# Patient Record
Sex: MALE | Race: WHITE | Employment: OTHER | URBAN - METROPOLITAN AREA
[De-identification: names, ages, dates, MRNs, and addresses within clinical notes are randomized per-mention and may not be internally consistent; named-entity substitution may affect disease eponyms.]

---

## 2017-01-17 ENCOUNTER — HOSPITAL ENCOUNTER (OUTPATIENT)
Dept: SURGERY | Age: 77
Discharge: HOME | End: 2017-01-17
Payer: MEDICARE

## 2017-01-17 VITALS
RESPIRATION RATE: 20 BRPM | BODY MASS INDEX: 30.35 KG/M2 | HEIGHT: 70 IN | OXYGEN SATURATION: 96 % | WEIGHT: 212 LBS | SYSTOLIC BLOOD PRESSURE: 139 MMHG | HEART RATE: 81 BPM | TEMPERATURE: 97.8 F | DIASTOLIC BLOOD PRESSURE: 75 MMHG

## 2017-01-17 LAB
ANION GAP BLD CALC-SCNC: 9 MMOL/L (ref 7–16)
BUN SERPL-MCNC: 21 MG/DL (ref 8–23)
CALCIUM SERPL-MCNC: 8.6 MG/DL (ref 8.3–10.4)
CHLORIDE SERPL-SCNC: 111 MMOL/L (ref 98–107)
CO2 SERPL-SCNC: 26 MMOL/L (ref 21–32)
CREAT SERPL-MCNC: 1.07 MG/DL (ref 0.8–1.5)
ERYTHROCYTE [DISTWIDTH] IN BLOOD BY AUTOMATED COUNT: 13.7 % (ref 11.9–14.6)
GLUCOSE SERPL-MCNC: 168 MG/DL (ref 65–100)
HCT VFR BLD AUTO: 45 % (ref 41.1–50.3)
HGB BLD-MCNC: 15.2 G/DL (ref 13.6–17.2)
MCH RBC QN AUTO: 30.4 PG (ref 26.1–32.9)
MCHC RBC AUTO-ENTMCNC: 33.8 G/DL (ref 31.4–35)
MCV RBC AUTO: 90 FL (ref 79.6–97.8)
PLATELET # BLD AUTO: 114 K/UL (ref 150–450)
PMV BLD AUTO: 10.9 FL (ref 10.8–14.1)
POTASSIUM SERPL-SCNC: 3.7 MMOL/L (ref 3.5–5.1)
RBC # BLD AUTO: 5 M/UL (ref 4.23–5.67)
SODIUM SERPL-SCNC: 146 MMOL/L (ref 136–145)
WBC # BLD AUTO: 14.8 K/UL (ref 4.3–11.1)

## 2017-01-17 PROCEDURE — 80048 BASIC METABOLIC PNL TOTAL CA: CPT | Performed by: UROLOGY

## 2017-01-17 PROCEDURE — 85027 COMPLETE CBC AUTOMATED: CPT | Performed by: UROLOGY

## 2017-01-17 NOTE — PERIOP NOTES
Patient verified name, , and surgery as listed in [facility]. TYPE  CASE:2  Orders per surgeon:  Received  Labs per surgeon:cbc, bmp  Labs per anesthesia protocol: none   EKG  :  16 EKG follwoed by16 ECHO-WNL placed on chart      Patient provided with handouts including guide to surgery , transfusions, pain management and hand hygiene for the family and community. Pt verbalizes understanding of all pre-op instructions . Instructed that family must be present in building at all times. Hibiclens and instructions given per hospital policy. Instructed patient to continue  previous medications as prescribed prior to surgery and  to take the following medications the day of surgery according to anesthesia guidelines : lipitor, coreg, paxil if needed           Continue all previous medications unless otherwise directed. Instructed patient to hold  the following medications prior to surgery: aspirin 81 mg with permission from Dr. Reynoso Has on chart      Patient verbalized understanding of all instructions and provided all medical/health information to the best of their ability.

## 2017-01-19 ENCOUNTER — ANESTHESIA (OUTPATIENT)
Dept: SURGERY | Age: 77
End: 2017-01-19
Payer: MEDICARE

## 2017-01-19 ENCOUNTER — ANESTHESIA EVENT (OUTPATIENT)
Dept: SURGERY | Age: 77
End: 2017-01-19
Payer: MEDICARE

## 2017-01-19 ENCOUNTER — HOSPITAL ENCOUNTER (OUTPATIENT)
Age: 77
Setting detail: OBSERVATION
Discharge: HOME | End: 2017-01-21
Attending: UROLOGY | Admitting: UROLOGY
Payer: MEDICARE

## 2017-01-19 LAB
ANION GAP BLD CALC-SCNC: 9 MMOL/L (ref 7–16)
BUN SERPL-MCNC: 22 MG/DL (ref 8–23)
CALCIUM SERPL-MCNC: 8 MG/DL (ref 8.3–10.4)
CHLORIDE SERPL-SCNC: 105 MMOL/L (ref 98–107)
CO2 SERPL-SCNC: 28 MMOL/L (ref 21–32)
CREAT SERPL-MCNC: 1.11 MG/DL (ref 0.8–1.5)
GLUCOSE SERPL-MCNC: 99 MG/DL (ref 65–100)
HCT VFR BLD AUTO: 40.4 % (ref 41.1–50.3)
HGB BLD-MCNC: 13.5 G/DL (ref 13.6–17.2)
POTASSIUM SERPL-SCNC: 3.7 MMOL/L (ref 3.5–5.1)
SODIUM SERPL-SCNC: 142 MMOL/L (ref 136–145)

## 2017-01-19 PROCEDURE — 74011250636 HC RX REV CODE- 250/636

## 2017-01-19 PROCEDURE — 36415 COLL VENOUS BLD VENIPUNCTURE: CPT | Performed by: UROLOGY

## 2017-01-19 PROCEDURE — 77030020782 HC GWN BAIR PAWS FLX 3M -B: Performed by: NURSE ANESTHETIST, CERTIFIED REGISTERED

## 2017-01-19 PROCEDURE — 77030033648: Performed by: UROLOGY

## 2017-01-19 PROCEDURE — 76210000004 HC OR PH I REC 4.5 TO 5 HR: Performed by: UROLOGY

## 2017-01-19 PROCEDURE — 77030018836 HC SOL IRR NACL ICUM -A

## 2017-01-19 PROCEDURE — 82355 CALCULUS ANALYSIS QUAL: CPT | Performed by: UROLOGY

## 2017-01-19 PROCEDURE — 77030005546 HC CATH URETH FOL 3W BARD -A: Performed by: UROLOGY

## 2017-01-19 PROCEDURE — 74011000258 HC RX REV CODE- 258: Performed by: UROLOGY

## 2017-01-19 PROCEDURE — 99218 HC RM OBSERVATION: CPT

## 2017-01-19 PROCEDURE — 80048 BASIC METABOLIC PNL TOTAL CA: CPT | Performed by: UROLOGY

## 2017-01-19 PROCEDURE — 77030011640 HC PAD GRND REM COVD -A: Performed by: UROLOGY

## 2017-01-19 PROCEDURE — 76060000035 HC ANESTHESIA 2 TO 2.5 HR: Performed by: UROLOGY

## 2017-01-19 PROCEDURE — 74011250636 HC RX REV CODE- 250/636: Performed by: UROLOGY

## 2017-01-19 PROCEDURE — 74011250636 HC RX REV CODE- 250/636: Performed by: ANESTHESIOLOGY

## 2017-01-19 PROCEDURE — 77030010545: Performed by: UROLOGY

## 2017-01-19 PROCEDURE — 85018 HEMOGLOBIN: CPT | Performed by: UROLOGY

## 2017-01-19 PROCEDURE — 74011000250 HC RX REV CODE- 250: Performed by: ANESTHESIOLOGY

## 2017-01-19 PROCEDURE — 77030019927 HC TBNG IRR CYSTO BAXT -A: Performed by: UROLOGY

## 2017-01-19 PROCEDURE — 88305 TISSUE EXAM BY PATHOLOGIST: CPT | Performed by: UROLOGY

## 2017-01-19 PROCEDURE — 76010000162 HC OR TIME 1.5 TO 2 HR INTENSV-TIER 1: Performed by: UROLOGY

## 2017-01-19 PROCEDURE — 77030018830 HC SOL IRR GLYC ICUM-A: Performed by: UROLOGY

## 2017-01-19 PROCEDURE — 77030020143 HC AIRWY LARYN INTUB CGAS -A: Performed by: NURSE ANESTHETIST, CERTIFIED REGISTERED

## 2017-01-19 PROCEDURE — 74011000250 HC RX REV CODE- 250

## 2017-01-19 PROCEDURE — 77030018832 HC SOL IRR H20 ICUM -A: Performed by: UROLOGY

## 2017-01-19 PROCEDURE — 77030018846 HC SOL IRR STRL H20 ICUM -A: Performed by: UROLOGY

## 2017-01-19 PROCEDURE — 77030032490 HC SLV COMPR SCD KNE COVD -B: Performed by: UROLOGY

## 2017-01-19 RX ORDER — CEFAZOLIN SODIUM IN 0.9 % NACL 2 G/50 ML
2 INTRAVENOUS SOLUTION, PIGGYBACK (ML) INTRAVENOUS ONCE
Status: COMPLETED | OUTPATIENT
Start: 2017-01-19 | End: 2017-01-19

## 2017-01-19 RX ORDER — ACETAMINOPHEN 325 MG/1
650 TABLET ORAL
Status: DISCONTINUED | OUTPATIENT
Start: 2017-01-19 | End: 2017-01-21 | Stop reason: HOSPADM

## 2017-01-19 RX ORDER — DEXTROSE MONOHYDRATE AND SODIUM CHLORIDE 5; .45 G/100ML; G/100ML
75 INJECTION, SOLUTION INTRAVENOUS CONTINUOUS
Status: DISCONTINUED | OUTPATIENT
Start: 2017-01-19 | End: 2017-01-20

## 2017-01-19 RX ORDER — ONDANSETRON 2 MG/ML
4 INJECTION INTRAMUSCULAR; INTRAVENOUS
Status: DISCONTINUED | OUTPATIENT
Start: 2017-01-19 | End: 2017-01-21 | Stop reason: HOSPADM

## 2017-01-19 RX ORDER — HYDROCODONE BITARTRATE AND ACETAMINOPHEN 5; 325 MG/1; MG/1
1 TABLET ORAL
Status: DISCONTINUED | OUTPATIENT
Start: 2017-01-19 | End: 2017-01-21 | Stop reason: HOSPADM

## 2017-01-19 RX ORDER — LIDOCAINE HYDROCHLORIDE 10 MG/ML
0.1 INJECTION INFILTRATION; PERINEURAL AS NEEDED
Status: DISCONTINUED | OUTPATIENT
Start: 2017-01-19 | End: 2017-01-19 | Stop reason: HOSPADM

## 2017-01-19 RX ORDER — LABETALOL HYDROCHLORIDE 5 MG/ML
10 INJECTION, SOLUTION INTRAVENOUS ONCE
Status: COMPLETED | OUTPATIENT
Start: 2017-01-19 | End: 2017-01-19

## 2017-01-19 RX ORDER — HYDROMORPHONE HYDROCHLORIDE 2 MG/ML
0.5 INJECTION, SOLUTION INTRAMUSCULAR; INTRAVENOUS; SUBCUTANEOUS
Status: DISCONTINUED | OUTPATIENT
Start: 2017-01-19 | End: 2017-01-19 | Stop reason: HOSPADM

## 2017-01-19 RX ORDER — SODIUM CHLORIDE 0.9 % (FLUSH) 0.9 %
5-10 SYRINGE (ML) INJECTION EVERY 8 HOURS
Status: CANCELLED | OUTPATIENT
Start: 2017-01-19

## 2017-01-19 RX ORDER — FENTANYL CITRATE 50 UG/ML
INJECTION, SOLUTION INTRAMUSCULAR; INTRAVENOUS AS NEEDED
Status: DISCONTINUED | OUTPATIENT
Start: 2017-01-19 | End: 2017-01-19 | Stop reason: HOSPADM

## 2017-01-19 RX ORDER — SODIUM CHLORIDE 0.9 % (FLUSH) 0.9 %
5-10 SYRINGE (ML) INJECTION AS NEEDED
Status: DISCONTINUED | OUTPATIENT
Start: 2017-01-19 | End: 2017-01-19 | Stop reason: HOSPADM

## 2017-01-19 RX ORDER — SODIUM CHLORIDE, SODIUM LACTATE, POTASSIUM CHLORIDE, CALCIUM CHLORIDE 600; 310; 30; 20 MG/100ML; MG/100ML; MG/100ML; MG/100ML
125 INJECTION, SOLUTION INTRAVENOUS CONTINUOUS
Status: DISCONTINUED | OUTPATIENT
Start: 2017-01-19 | End: 2017-01-19 | Stop reason: HOSPADM

## 2017-01-19 RX ORDER — CARVEDILOL 25 MG/1
25 TABLET ORAL 2 TIMES DAILY WITH MEALS
Status: DISCONTINUED | OUTPATIENT
Start: 2017-01-20 | End: 2017-01-21 | Stop reason: HOSPADM

## 2017-01-19 RX ORDER — LABETALOL HYDROCHLORIDE 5 MG/ML
INJECTION, SOLUTION INTRAVENOUS
Status: ACTIVE
Start: 2017-01-19 | End: 2017-01-20

## 2017-01-19 RX ORDER — RAMIPRIL 5 MG/1
5 CAPSULE ORAL
Status: DISCONTINUED | OUTPATIENT
Start: 2017-01-20 | End: 2017-01-21 | Stop reason: HOSPADM

## 2017-01-19 RX ORDER — LABETALOL HYDROCHLORIDE 5 MG/ML
INJECTION, SOLUTION INTRAVENOUS AS NEEDED
Status: DISCONTINUED | OUTPATIENT
Start: 2017-01-19 | End: 2017-01-19 | Stop reason: HOSPADM

## 2017-01-19 RX ORDER — ONDANSETRON 2 MG/ML
INJECTION INTRAMUSCULAR; INTRAVENOUS AS NEEDED
Status: DISCONTINUED | OUTPATIENT
Start: 2017-01-19 | End: 2017-01-19 | Stop reason: HOSPADM

## 2017-01-19 RX ORDER — PROPOFOL 10 MG/ML
INJECTION, EMULSION INTRAVENOUS AS NEEDED
Status: DISCONTINUED | OUTPATIENT
Start: 2017-01-19 | End: 2017-01-19 | Stop reason: HOSPADM

## 2017-01-19 RX ORDER — SODIUM CHLORIDE 0.9 % (FLUSH) 0.9 %
5-10 SYRINGE (ML) INJECTION AS NEEDED
Status: DISCONTINUED | OUTPATIENT
Start: 2017-01-19 | End: 2017-01-21 | Stop reason: HOSPADM

## 2017-01-19 RX ORDER — OXYCODONE HYDROCHLORIDE 5 MG/1
10 TABLET ORAL
Status: DISCONTINUED | OUTPATIENT
Start: 2017-01-19 | End: 2017-01-19 | Stop reason: HOSPADM

## 2017-01-19 RX ORDER — HYDROMORPHONE HYDROCHLORIDE 1 MG/ML
.5-2 INJECTION, SOLUTION INTRAMUSCULAR; INTRAVENOUS; SUBCUTANEOUS
Status: DISCONTINUED | OUTPATIENT
Start: 2017-01-19 | End: 2017-01-21 | Stop reason: HOSPADM

## 2017-01-19 RX ORDER — OXYBUTYNIN CHLORIDE 5 MG/1
5 TABLET ORAL
Status: DISCONTINUED | OUTPATIENT
Start: 2017-01-19 | End: 2017-01-21 | Stop reason: HOSPADM

## 2017-01-19 RX ORDER — PAROXETINE 10 MG/1
5 TABLET, FILM COATED ORAL DAILY
Status: DISCONTINUED | OUTPATIENT
Start: 2017-01-20 | End: 2017-01-21 | Stop reason: HOSPADM

## 2017-01-19 RX ORDER — NALOXONE HYDROCHLORIDE 0.4 MG/ML
0.1 INJECTION, SOLUTION INTRAMUSCULAR; INTRAVENOUS; SUBCUTANEOUS AS NEEDED
Status: DISCONTINUED | OUTPATIENT
Start: 2017-01-19 | End: 2017-01-19 | Stop reason: HOSPADM

## 2017-01-19 RX ORDER — NALOXONE HYDROCHLORIDE 0.4 MG/ML
0.4 INJECTION, SOLUTION INTRAMUSCULAR; INTRAVENOUS; SUBCUTANEOUS AS NEEDED
Status: DISCONTINUED | OUTPATIENT
Start: 2017-01-19 | End: 2017-01-21 | Stop reason: HOSPADM

## 2017-01-19 RX ORDER — SODIUM CHLORIDE 0.9 % (FLUSH) 0.9 %
5-10 SYRINGE (ML) INJECTION EVERY 8 HOURS
Status: DISCONTINUED | OUTPATIENT
Start: 2017-01-19 | End: 2017-01-21 | Stop reason: HOSPADM

## 2017-01-19 RX ORDER — LIDOCAINE HYDROCHLORIDE 20 MG/ML
INJECTION, SOLUTION EPIDURAL; INFILTRATION; INTRACAUDAL; PERINEURAL AS NEEDED
Status: DISCONTINUED | OUTPATIENT
Start: 2017-01-19 | End: 2017-01-19 | Stop reason: HOSPADM

## 2017-01-19 RX ADMIN — FENTANYL CITRATE 25 MCG: 50 INJECTION, SOLUTION INTRAMUSCULAR; INTRAVENOUS at 12:58

## 2017-01-19 RX ADMIN — FENTANYL CITRATE 50 MCG: 50 INJECTION, SOLUTION INTRAMUSCULAR; INTRAVENOUS at 12:11

## 2017-01-19 RX ADMIN — FENTANYL CITRATE 50 MCG: 50 INJECTION, SOLUTION INTRAMUSCULAR; INTRAVENOUS at 13:24

## 2017-01-19 RX ADMIN — FENTANYL CITRATE 25 MCG: 50 INJECTION, SOLUTION INTRAMUSCULAR; INTRAVENOUS at 13:01

## 2017-01-19 RX ADMIN — Medication 10 ML: at 22:41

## 2017-01-19 RX ADMIN — LABETALOL HYDROCHLORIDE 10 MG: 5 INJECTION, SOLUTION INTRAVENOUS at 14:38

## 2017-01-19 RX ADMIN — LABETALOL HYDROCHLORIDE 10 MG: 5 INJECTION, SOLUTION INTRAVENOUS at 14:04

## 2017-01-19 RX ADMIN — FENTANYL CITRATE 25 MCG: 50 INJECTION, SOLUTION INTRAMUSCULAR; INTRAVENOUS at 12:36

## 2017-01-19 RX ADMIN — FENTANYL CITRATE 25 MCG: 50 INJECTION, SOLUTION INTRAMUSCULAR; INTRAVENOUS at 12:35

## 2017-01-19 RX ADMIN — HYDROMORPHONE HYDROCHLORIDE 0.5 MG: 2 INJECTION, SOLUTION INTRAMUSCULAR; INTRAVENOUS; SUBCUTANEOUS at 14:18

## 2017-01-19 RX ADMIN — PROPOFOL 200 MG: 10 INJECTION, EMULSION INTRAVENOUS at 12:11

## 2017-01-19 RX ADMIN — ONDANSETRON 4 MG: 2 INJECTION INTRAMUSCULAR; INTRAVENOUS at 13:47

## 2017-01-19 RX ADMIN — CEFAZOLIN SODIUM 1 G: 1 INJECTION, POWDER, FOR SOLUTION INTRAMUSCULAR; INTRAVENOUS at 22:31

## 2017-01-19 RX ADMIN — LIDOCAINE HYDROCHLORIDE 100 MG: 20 INJECTION, SOLUTION EPIDURAL; INFILTRATION; INTRACAUDAL; PERINEURAL at 12:11

## 2017-01-19 RX ADMIN — HYDROMORPHONE HYDROCHLORIDE 0.5 MG: 2 INJECTION, SOLUTION INTRAMUSCULAR; INTRAVENOUS; SUBCUTANEOUS at 15:40

## 2017-01-19 RX ADMIN — SODIUM CHLORIDE, SODIUM LACTATE, POTASSIUM CHLORIDE, AND CALCIUM CHLORIDE 125 ML/HR: 600; 310; 30; 20 INJECTION, SOLUTION INTRAVENOUS at 11:13

## 2017-01-19 RX ADMIN — HYDROMORPHONE HYDROCHLORIDE 1 MG: 1 INJECTION, SOLUTION INTRAMUSCULAR; INTRAVENOUS; SUBCUTANEOUS at 21:29

## 2017-01-19 RX ADMIN — DEXTROSE MONOHYDRATE AND SODIUM CHLORIDE 75 ML/HR: 5; .45 INJECTION, SOLUTION INTRAVENOUS at 22:27

## 2017-01-19 RX ADMIN — CEFAZOLIN 2 G: 1 INJECTION, POWDER, FOR SOLUTION INTRAMUSCULAR; INTRAVENOUS; PARENTERAL at 12:20

## 2017-01-19 NOTE — IP AVS SNAPSHOT
Current Discharge Medication List  
  
Take these medications at their scheduled times Dose & Instructions Dispensing Information Comments Morning Noon Evening Bedtime  
 atorvastatin 20 mg tablet Commonly known as:  LIPITOR Your next dose is: Today, Tomorrow Other:  ____________ Dose:  20 mg Take 20 mg by mouth every morning. Refills:  0 COREG 25 mg tablet Generic drug:  carvedilol Your next dose is: Today, Tomorrow Other:  ____________ Dose:  25 mg Take 25 mg by mouth two (2) times daily (with meals). Refills:  0  
     
   
   
   
  
 nitrofurantoin 100 mg capsule Commonly known as:  MACRODANTIN Your next dose is: Today, Tomorrow Other:  ____________ Dose:  100 mg Take 1 Cap by mouth two (2) times a day. Quantity:  6 Cap Refills:  0  
     
   
   
   
  
 ramipril 5 mg capsule Commonly known as:  ALTACE Your next dose is: Today, Tomorrow Other:  ____________ Dose:  5 mg Take 5 mg by mouth every morning. Refills:  0 Take these medications as needed Dose & Instructions Dispensing Information Comments Morning Noon Evening Bedtime PAXIL 10 mg tablet Generic drug:  PARoxetine Your next dose is: Today, Tomorrow Other:  ____________ Dose:  5 mg Take 5 mg by mouth as needed. Refills:  0 phenazopyridine 200 mg tablet Commonly known as:  PYRIDIUM Your next dose is: Today, Tomorrow Other:  ____________ Dose:  200 mg Take 1 Tab by mouth three (3) times daily as needed for Pain for up to 3 days. Indications: DYSURIA Quantity:  9 Tab Refills:  0 Where to Get Your Medications These medications were sent to [address] [place] [address] [address]  [address] [address], [address] Phone:  [phone]  
  nitrofurantoin 100 mg capsule  
 phenazopyridine 200 mg tablet

## 2017-01-19 NOTE — PERIOP NOTES
Pt c/o back pain, cont to wait for bed assignment on the 6th floor. Turned and positioned on left side. Gave pt prn Dilaudid for pain. Gu with irrigant draining pink urine.

## 2017-01-19 NOTE — PROGRESS NOTES
TRANSFER - IN REPORT:    Verbal report received from Jamee Ramos RN(name) on Marco Antonio Andujar  being received from pacu(unit) for routine post - op      Report consisted of patients Situation, Background, Assessment and   Recommendations(SBAR). Information from the following report(s) SBAR was reviewed with the receiving nurse. Opportunity for questions and clarification was provided. Assessment completed upon patients arrival to unit and care assumed.

## 2017-01-19 NOTE — ANESTHESIA POSTPROCEDURE EVALUATION
Post-Anesthesia Evaluation and Assessment    Patient: Sola Cox MRN: [id number]  SSN: [id number]    YOB: 1940  Age: 77 y.o. Sex: male       Cardiovascular Function/Vital Signs  Visit Vitals    /80    Pulse 90    Temp 37.2 °C (98.9 °F)    Resp 16    SpO2 92%       Patient is status post general anesthesia for Procedure(s):  CYSTO LITHOLAPAXY  CYSTOSCOPY TRANSURETHRAL RESECTION OF PROSTATE. Nausea/Vomiting: None    Postoperative hydration reviewed and adequate. Pain:  Pain Scale 1: Numeric (0 - 10) (01/19/17 1502)  Pain Intensity 1: 3 (01/19/17 1502)   Managed    Neurological Status:   Neuro (WDL): Within Defined Limits (01/19/17 1502)  Neuro  Neurologic State: Drowsy (01/19/17 1406)  LUE Motor Response: Spontaneous ; Purposeful (01/19/17 1406)  LLE Motor Response: Spontaneous ; Purposeful (01/19/17 1406)  RUE Motor Response: Spontaneous ; Purposeful (01/19/17 1406)  RLE Motor Response: Spontaneous ; Purposeful (01/19/17 1406)   At baseline    Mental Status and Level of Consciousness: Arousable    Pulmonary Status:   O2 Device: Nasal cannula (01/19/17 1532)   Adequate oxygenation and airway patent    Complications related to anesthesia: None    Post-anesthesia assessment completed.  No concerns    Signed By: Liz Romero MD     January 19, 2017

## 2017-01-19 NOTE — ANESTHESIA PREPROCEDURE EVALUATION
Anesthetic History               Review of Systems / Medical History  Patient summary reviewed, nursing notes reviewed and pertinent labs reviewed    Pulmonary                   Neuro/Psych              Cardiovascular    Hypertension: well controlled          CAD (CABG 2013)    Exercise tolerance: >4 METS: Pt still works actively as EMT  Comments: Cath Oct 2016:  Patent grafts (2 of 2)   GI/Hepatic/Renal           PUD (Remote hx)     Endo/Other        Arthritis     Other Findings            Physical Exam    Airway  Mallampati: II  TM Distance: > 6 cm  Neck ROM: decreased range of motion   Mouth opening: Normal     Cardiovascular    Rhythm: regular  Rate: normal         Dental         Pulmonary  Breath sounds clear to auscultation               Abdominal  GI exam deferred       Other Findings            Anesthetic Plan    ASA: 3  Anesthesia type: general            Anesthetic plan and risks discussed with: Patient

## 2017-01-19 NOTE — PERIOP NOTES
Admitted to PACU s/p turp with hypertension. CRNA made Dr. Samantha Taylor aware and order rec'd for Labetalol. Labetalol 10 mg given IV by CRNA.

## 2017-01-19 NOTE — IP AVS SNAPSHOT
Wytanisha Willis 
 
 
 [address] [facility] [address] [facility] 
[phone] Patient: Dannie Collins MRN: [id number] ROS:6/96/0982 You are allergic to the following Allergen Reactions Ciprofloxacin Nausea Only Lopressor (Metoprolol Tartrate) Other (comments) BP erratic Recent Documentation Smoking Status Never Smoker Emergency Contacts Name Discharge Info Relation Home Work Mobile Jazmyne Del Rosario  Child [2] [phone] Kaelyn Winters DISCHARGE CAREGIVER [3] Friend [5] About your hospitalization You were admitted on:  January 19, 2017 You last received care in the:  [facility] 6 MED SURG You were discharged on:  January 21, 2017 Unit phone number:  [phone] Why you were hospitalized Your primary diagnosis was:  Bph (Benign Prostatic Hyperplasia) Your diagnoses also included:  Bladder Stone Providers Seen During Your Hospitalizations Provider Role Specialty Primary office phone Jensen Adair MD Attending Provider Urology [phone] Your Primary Care Physician (PCP) Primary Care Physician Office Phone Office Fax OTHER, PHYS ** None ** ** None ** Follow-up Information Follow up With Details Comments Contact Info Anjel Anaya MD   Patient can only remember the practice name and not the physician Current Discharge Medication List  
  
START taking these medications Dose & Instructions Dispensing Information Comments Morning Noon Evening Bedtime  
 nitrofurantoin 100 mg capsule Commonly known as:  MACRODANTIN Your next dose is: Today, Tomorrow Other:  _________ Dose:  100 mg Take 1 Cap by mouth two (2) times a day. Quantity:  6 Cap Refills:  0 phenazopyridine 200 mg tablet Commonly known as:  PYRIDIUM Your next dose is: Today, Tomorrow Other:  _________ Dose:  200 mg Take 1 Tab by mouth three (3) times daily as needed for Pain for up to 3 days. Indications: DYSURIA Quantity:  9 Tab Refills:  0 CONTINUE these medications which have NOT CHANGED Dose & Instructions Dispensing Information Comments Morning Noon Evening Bedtime  
 atorvastatin 20 mg tablet Commonly known as:  LIPITOR Your next dose is: Today, Tomorrow Other:  _________ Dose:  20 mg Take 20 mg by mouth every morning. Refills:  0 COREG 25 mg tablet Generic drug:  carvedilol Your next dose is: Today, Tomorrow Other:  _________ Dose:  25 mg Take 25 mg by mouth two (2) times daily (with meals). Refills:  0 PAXIL 10 mg tablet Generic drug:  PARoxetine Your next dose is: Today, Tomorrow Other:  _________ Dose:  5 mg Take 5 mg by mouth as needed. Refills:  0  
     
   
   
   
  
 ramipril 5 mg capsule Commonly known as:  ALTACE Your next dose is: Today, Tomorrow Other:  _________ Dose:  5 mg Take 5 mg by mouth every morning. Refills:  0 STOP taking these medications   
 aspirin delayed-release 81 mg tablet Where to Get Your Medications These medications were sent to [address]   [place] [address] Phone:  [phone]  
  nitrofurantoin 100 mg capsule  
 phenazopyridine 200 mg tablet Discharge Instructions Transurethral Resection of the Prostate (TURP): Before Your Surgery What is transurethral resection of the prostate? Transurethral resection of the prostate (TURP) is surgery to take out a part of your prostate gland. It is done when the prostate gets too large. The prostate gland is a small organ just below a man's bladder. It makes most of the fluid in semen. It surrounds the urethra, the tube that carries urine from the bladder out of the body through the penis. Your doctor will give you medicine to make you sleep or feel relaxed. If you are awake during the surgery, you will get medicine to numb you from the chest down. You will not feel pain. The doctor puts a thin, lighted tube into your urethra. This is called a resectoscope or scope. It goes in through the opening in your penis. Then the doctor puts small surgical tools through the scope. These are used to remove the part of the prostate that is blocking urine flow. When the doctor is finished, he or she takes out the scope. This surgery may make it easier for you to urinate. You may have better control when you start and stop your urine stream. And you may feel like you get more relief when you urinate. Most men go home from the hospital 1 or 2 days after surgery. You may be able to go back to work or most of your usual routine in 1 to 3 weeks. But for about 6 weeks, you will need to avoid heavy lifting and activities that might put extra pressure on your bladder. Follow-up care is a key part of your treatment and safety. Be sure to make and go to all appointments, and call your doctor if you are having problems. It's also a good idea to know your test results and keep a list of the medicines you take. What happens before surgery? Surgery can be stressful. This information will help you understand what you can expect. And it will help you safely prepare for your surgery. Preparing for surgery · Understand exactly what surgery is planned, along with the risks, benefits, and other options. · Tell your doctors ALL the medicines, vitamins, supplements, and herbal remedies you take. Some of these can increase the risk of bleeding or interact with anesthesia. · If you take blood thinners, such as warfarin (Coumadin), clopidogrel (Plavix), or aspirin, be sure to talk to your doctor. He or she will tell you if you should stop taking these medicines before your surgery. Make sure that you understand exactly what your doctor wants you to do. · Your doctor will tell you which medicines to take or stop before your surgery. You may need to stop taking certain medicines a week or more before surgery. So talk to your doctor as soon as you can. · If you have an advance directive, let your doctor know. It may include a living will and a durable power of  for health care. Bring a copy to the hospital. If you don't have one, you may want to prepare one. It lets your doctor and loved ones know your health care wishes. Doctors advise that everyone prepare these papers before any type of surgery or procedure. · You may need to empty your bowels with an enema or laxative. Your doctor will tell you how to do this. What happens on the day of surgery? · Follow the instructions exactly about when to stop eating and drinking. If you don't, your surgery may be canceled. If your doctor told you to take your medicines on the day of surgery, take them with only a sip of water. · Take a bath or shower before you come in for your surgery. Do not apply lotions, perfumes, deodorants, or nail polish. · Do not shave the surgical site yourself. · Take off all jewelry and piercings. And take out contact lenses, if you wear them. At the hospital or surgery center · Bring a picture ID. · The area for surgery is often marked to make sure there are no errors. · You will be kept comfortable and safe by your anesthesia provider. The anesthesia may make you sleep. Or it may just numb the area being worked on. · The surgery will take 1 to 2 hours. Going home · Be sure you have someone to drive you home. Anesthesia and pain medicine make it unsafe for you to drive. · You will be given more specific instructions about recovering from your surgery. They will cover things like diet, wound care, follow-up care, driving, and getting back to your normal routine. · You may go home with a urinary catheter in place. A urinary catheter is a flexible plastic tube used to drain urine from your bladder when you cannot urinate on your own. Your doctor will give you instructions about how to care for your catheter and when it can be removed. When should you call your doctor? · You have questions or concerns. · You don't understand how to prepare for your surgery. · You become ill before the surgery (such as fever, flu, or a cold). · You need to reschedule or have changed your mind about having the surgery. Where can you learn more? Go to http://jose-willa.info/. Enter X721 in the search box to learn more about \"Transurethral Resection of the Prostate (TURP): Before Your Surgery. \" Current as of: May 24, 2016 Content Version: 11.1 © 3728-1792 [employer]. Care instructions adapted under license by [employer] (which disclaims liability or warranty for this information). If you have questions about a medical condition or this instruction, always ask your healthcare professional. [place] [address] any warranty or liability for your use of this information. DISCHARGE SUMMARY from Nurse The following personal items are in your possession at time of discharge: 
 
Dental Appliances: None Visual Aid: Glasses Home Medications: None Jewelry: None Clothing: Footwear, Jacket/Coat, Pants, Shirt, Socks, Undergarments Other Valuables: Cell Phone, Eyeglasses PATIENT INSTRUCTIONS: 
 
 
F-face looks uneven A-arms unable to move or move unevenly S-speech slurred or non-existent T-time-call 911 as soon as signs and symptoms begin-DO NOT go Back to bed or wait to see if you get better-TIME IS BRAIN. Warning Signs of HEART ATTACK Call 911 if you have these symptoms: 
? Chest discomfort. Most heart attacks involve discomfort in the center of the chest that lasts more than a few minutes, or that goes away and comes back. It can feel like uncomfortable pressure, squeezing, fullness, or pain. ? Discomfort in other areas of the upper body. Symptoms can include pain or discomfort in one or both arms, the back, neck, jaw, or stomach. ? Shortness of breath with or without chest discomfort. ? Other signs may include breaking out in a cold sweat, nausea, or lightheadedness. Don't wait more than five minutes to call [address]! Fast action can save your life. Calling 911 is almost always the fastest way to get lifesaving treatment. Emergency Medical Services staff can begin treatment when they arrive  up to an hour sooner than if someone gets to the hospital by car. The discharge information has been reviewed with the patient. The patient verbalized understanding. Discharge medications reviewed with the patient and appropriate educational materials and side effects teaching were provided. Discharge Orders None Introducing [facility] [place]! Dear Lucy : Thank you for requesting a [employer] account. Our records indicate that you already have an active [employer] account. You can access your account anytime at [email or web address] Did you know that you can access your hospital and ER discharge instructions at any time in [employer]? You can also review all of your test results from your hospital stay or ER visit. Additional Information If you have questions, please visit the Frequently Asked Questions section of the [employer] website at [email or web address] Remember, MyChart is NOT to be used for urgent needs. For medical emergencies, dial 911. Now available from your iPhone and Android! General Information Please provide this summary of care documentation to your next provider. Patient Signature:  ____________________________________________________________ Date:  ____________________________________________________________  
  
[facility] Provider Signature:  ____________________________________________________________ Date:  ____________________________________________________________

## 2017-01-19 NOTE — PERIOP NOTES
TRANSFER - OUT REPORT:    Verbal report given to [facility] RN(name) on Drew Gann  being transferred to 6(unit) for routine post - op       Report consisted of patients Situation, Background, Assessment and   Recommendations(SBAR). Information from the following report(s) Kardex, OR Summary, Intake/Output and MAR was reviewed with the receiving nurse. Lines:   Peripheral IV 01/19/17 Left Hand (Active)   Site Assessment Clean, dry, & intact 1/19/2017  3:02 PM   Phlebitis Assessment 0 1/19/2017  3:02 PM   Infiltration Assessment 0 1/19/2017  3:02 PM   Dressing Status Clean, dry, & intact 1/19/2017  3:02 PM   Dressing Type Tape;Transparent 1/19/2017  3:02 PM   Hub Color/Line Status Pink; Infusing 1/19/2017  3:02 PM        Opportunity for questions and clarification was provided. VTE prophylaxis orders have been written for Drew Gann.

## 2017-01-19 NOTE — IP AVS SNAPSHOT
Summary of Care Report The Summary of Care report has been created to help improve care coordination. Users with access to [employer] or [address] (Web-based application) may access additional patient information including the Discharge Summary. If you are not currently a [address] user and need more information, please call the number listed below in the [place] [address] section and ask to be connected with Medical Records. Facility Information Name Address Phone [address] [address] [address] [phone] Patient Information Patient Name Sex KOMAL Blackwell ([id number]) Male 1940 Discharge Information Admitting Provider Service Area Unit Natalee Jett MD / [phone] [address] [place] [address],[facility] Floor 6 Med Surg / [phone] Discharge Provider Discharge Date/Time Discharge Disposition Destination (none) 2017 Morning (Pending) AHR (none) Patient Language Language ENGLISH [13] Problem List as of 2017  Date Reviewed: 2017 Codes Priority Class Noted - Resolved S/P CABG x 2 ICD-10-CM: Z95.1 ICD-9-CM: V45.81   10/23/2013 - Present Prostatism ICD-10-CM: N40.0 ICD-9-CM: 600.90   10/23/2013 - Present Overview Signed 10/23/2013  1:34 PM by Akosua Fam MD  
  Difficulty placing littlejohn in OR 
  
  
 CAD (coronary artery disease) (Chronic) ICD-10-CM: I25.10 ICD-9-CM: 414.00   10/23/2013 - Present Overview Addendum 10/23/2013  5:08 PM by Gregoria Nyhan, NP  
  10/23/13 (Dr. Ama Gu vessel CABG using SVG to the OM and LIMA to the LAD Hx of MI ~  HTN (hypertension) (Chronic) ICD-10-CM: I10 
ICD-9-CM: 401.9   10/23/2013 - Present RESOLVED: Encounter for weaning from ventilator [facility]) ICD-10-CM: Z99.11 ICD-9-CM: V46.13   10/23/2013 - 10/24/2013  Hypoxemia ICD-10-CM: R09.02 
 ICD-9-CM: 799.02   10/24/2013 - Present Atelectasis ICD-10-CM: J98.11 ICD-9-CM: 518.0   10/24/2013 - Present Thrombocytopenia ([address] ICD-10-CM: D69.6 ICD-9-CM: 287.5   10/24/2013 - Present Hypernatremia ICD-10-CM: E87.0 ICD-9-CM: 276.0   10/24/2013 - Present * (Principal)BPH (benign prostatic hyperplasia) (Chronic) ICD-10-CM: N40.0 ICD-9-CM: 600.00   10/24/2013 - Present Overview Signed 1/20/2017  6:55 AM by Peewee Kennedy MD  
  Date of Procedure: 1/19/2017 Preoperative Diagnosis: Lower urinary tract symptoms [R39.9] Benign nodular prostatic hyperplasia, presence of lower urinary tract symptoms unspecified [N40.0] Bladder calculus [N21.0] Postoperative Diagnosis: * No post-op diagnosis entered * Procedure(s): 
CYSTO LITHOLAPAXY CYSTOSCOPY TRANSURETHRAL RESECTION OF PROSTATE RESOLVED: Encounter for urinary catheter ICD-10-CM: Z46.6 ICD-9-CM: V53.6   10/23/2013 - 10/27/2013 Overview Signed 10/24/2013  1:48 PM by BELEN Ortiz Dr. 10/23/13 Intraoperative Cystourethroscopy and difficult catheter placement. Atrial fibrillation [facility]) ICD-10-CM: I48.91 
ICD-9-CM: 427.31   10/25/2013 - Present Pleural effusion, left ICD-10-CM: J90 ICD-9-CM: 511.9   11/21/2013 - Present HLD (hyperlipidemia) ICD-10-CM: E78.5 ICD-9-CM: 272.4   6/3/2016 - Present Bladder stone ICD-10-CM: N21.0 ICD-9-CM: 594.1   1/20/2017 - Present You are allergic to the following Allergen Reactions Ciprofloxacin Nausea Only Lopressor (Metoprolol Tartrate) Other (comments) BP erratic Current Discharge Medication List  
  
START taking these medications Dose & Instructions Dispensing Information Comments  
 nitrofurantoin 100 mg capsule Commonly known as:  MACRODANTIN Dose:  100 mg Take 1 Cap by mouth two (2) times a day. Quantity:  6 Cap Refills:  0 phenazopyridine 200 mg tablet Commonly known as:  PYRIDIUM  
 Dose:  200 mg Take 1 Tab by mouth three (3) times daily as needed for Pain for up to 3 days. Indications: DYSURIA Quantity:  9 Tab Refills:  0 CONTINUE these medications which have NOT CHANGED Dose & Instructions Dispensing Information Comments  
 atorvastatin 20 mg tablet Commonly known as:  LIPITOR Dose:  20 mg Take 20 mg by mouth every morning. Refills:  0 COREG 25 mg tablet Generic drug:  carvedilol Dose:  25 mg Take 25 mg by mouth two (2) times daily (with meals). Refills:  0 PAXIL 10 mg tablet Generic drug:  PARoxetine Dose:  5 mg Take 5 mg by mouth as needed. Refills:  0  
   
 ramipril 5 mg capsule Commonly known as:  ALTACE Dose:  5 mg Take 5 mg by mouth every morning. Refills:  0 STOP taking these medications Comments  
 aspirin delayed-release 81 mg tablet Current Immunizations Name Date Influenza Vaccine 10/13/2016, 10/6/2015, 10/2/2014, 10/1/2013, 11/7/2012, 10/19/2011 Influenza Vaccine PF 10/31/2013 Pneumococcal Conjugate (PCV-13) 10/13/2016 Pneumococcal Polysaccharide (PPSV-23) 1/3/2005 Pneumococcal Vaccine (Unspecified Type) 1/1/2013 Td 10/2/2014, 1/3/2005 Td, Adsorbed PF 10/2/2014, 1/3/2005 Surgery Information ID Date/Time Status Primary Surgeon All Procedures Location [id number] 1/19/2017 12:30 PM Roberta Butler MD CYSTO LITHOLAPAXY CYSTOSCOPY TRANSURETHRAL RESECTION OF PROSTATE [facility] OR Follow-up Information Follow up With Details Comments Contact Info Anjel Anaya MD   Patient can only remember the practice name and not the physician Discharge Instructions Transurethral Resection of the Prostate (TURP): Before Your Surgery What is transurethral resection of the prostate?  
 
Transurethral resection of the prostate (TURP) is surgery to take out a part of your prostate gland. It is done when the prostate gets too large. The prostate gland is a small organ just below a man's bladder. It makes most of the fluid in semen. It surrounds the urethra, the tube that carries urine from the bladder out of the body through the penis. Your doctor will give you medicine to make you sleep or feel relaxed. If you are awake during the surgery, you will get medicine to numb you from the chest down. You will not feel pain. The doctor puts a thin, lighted tube into your urethra. This is called a resectoscope or scope. It goes in through the opening in your penis. Then the doctor puts small surgical tools through the scope. These are used to remove the part of the prostate that is blocking urine flow. When the doctor is finished, he or she takes out the scope. This surgery may make it easier for you to urinate. You may have better control when you start and stop your urine stream. And you may feel like you get more relief when you urinate. Most men go home from the hospital 1 or 2 days after surgery. You may be able to go back to work or most of your usual routine in 1 to 3 weeks. But for about 6 weeks, you will need to avoid heavy lifting and activities that might put extra pressure on your bladder. Follow-up care is a key part of your treatment and safety. Be sure to make and go to all appointments, and call your doctor if you are having problems. It's also a good idea to know your test results and keep a list of the medicines you take. What happens before surgery? Surgery can be stressful. This information will help you understand what you can expect. And it will help you safely prepare for your surgery. Preparing for surgery · Understand exactly what surgery is planned, along with the risks, benefits, and other options. · Tell your doctors ALL the medicines, vitamins, supplements, and herbal remedies you take.  Some of these can increase the risk of bleeding or interact with anesthesia. · If you take blood thinners, such as warfarin (Coumadin), clopidogrel (Plavix), or aspirin, be sure to talk to your doctor. He or she will tell you if you should stop taking these medicines before your surgery. Make sure that you understand exactly what your doctor wants you to do. · Your doctor will tell you which medicines to take or stop before your surgery. You may need to stop taking certain medicines a week or more before surgery. So talk to your doctor as soon as you can. · If you have an advance directive, let your doctor know. It may include a living will and a durable power of  for health care. Bring a copy to the hospital. If you don't have one, you may want to prepare one. It lets your doctor and loved ones know your health care wishes. Doctors advise that everyone prepare these papers before any type of surgery or procedure. · You may need to empty your bowels with an enema or laxative. Your doctor will tell you how to do this. What happens on the day of surgery? · Follow the instructions exactly about when to stop eating and drinking. If you don't, your surgery may be canceled. If your doctor told you to take your medicines on the day of surgery, take them with only a sip of water. · Take a bath or shower before you come in for your surgery. Do not apply lotions, perfumes, deodorants, or nail polish. · Do not shave the surgical site yourself. · Take off all jewelry and piercings. And take out contact lenses, if you wear them. At the hospital or surgery center · Bring a picture ID. · The area for surgery is often marked to make sure there are no errors. · You will be kept comfortable and safe by your anesthesia provider. The anesthesia may make you sleep. Or it may just numb the area being worked on. · The surgery will take 1 to 2 hours. Going home · Be sure you have someone to drive you home.  Anesthesia and pain medicine make it unsafe for you to drive. · You will be given more specific instructions about recovering from your surgery. They will cover things like diet, wound care, follow-up care, driving, and getting back to your normal routine. · You may go home with a urinary catheter in place. A urinary catheter is a flexible plastic tube used to drain urine from your bladder when you cannot urinate on your own. Your doctor will give you instructions about how to care for your catheter and when it can be removed. When should you call your doctor? · You have questions or concerns. · You don't understand how to prepare for your surgery. · You become ill before the surgery (such as fever, flu, or a cold). · You need to reschedule or have changed your mind about having the surgery. Where can you learn more? Go to http://jose-willa.info/. Enter P841 in the search box to learn more about \"Transurethral Resection of the Prostate (TURP): Before Your Surgery. \" Current as of: May 24, 2016 Content Version: 11.1 © 6316-3115 [employer]. Care instructions adapted under license by [employer] (which disclaims liability or warranty for this information). If you have questions about a medical condition or this instruction, always ask your healthcare professional. [address] any warranty or liability for your use of this information. DISCHARGE SUMMARY from Nurse The following personal items are in your possession at time of discharge: 
 
Dental Appliances: None Visual Aid: Glasses Home Medications: None Jewelry: None Clothing: Footwear, Jacket/Coat, Pants, Shirt, Socks, Undergarments Other Valuables: Cell Phone, Eyeglasses PATIENT INSTRUCTIONS: 
 
 
F-face looks uneven A-arms unable to move or move unevenly S-speech slurred or non-existent T-time-call 911 as soon as signs and symptoms begin-DO NOT go Back to bed or wait to see if you get better-TIME IS BRAIN. Warning Signs of HEART ATTACK Call 911 if you have these symptoms: 
? Chest discomfort. Most heart attacks involve discomfort in the center of the chest that lasts more than a few minutes, or that goes away and comes back. It can feel like uncomfortable pressure, squeezing, fullness, or pain. ? Discomfort in other areas of the upper body. Symptoms can include pain or discomfort in one or both arms, the back, neck, jaw, or stomach. ? Shortness of breath with or without chest discomfort. ? Other signs may include breaking out in a cold sweat, nausea, or lightheadedness. Don't wait more than five minutes to call [address]! Fast action can save your life. Calling 911 is almost always the fastest way to get lifesaving treatment. Emergency Medical Services staff can begin treatment when they arrive  up to an hour sooner than if someone gets to the hospital by car. The discharge information has been reviewed with the patient. The patient verbalized understanding. Discharge medications reviewed with the patient and appropriate educational materials and side effects teaching were provided. Chart Review Routing History Recipient Method Report Sent By Mp Tucker MD  
Fax: [phone] Phone: [phone] Fax IP Auto Routed Rosanna Rincon MD [[id number]] 10/27/2013 11:56 AM 10/27/2013 Jesica Tucker MD  
Fax: [phone] Phone: [phone] Fax IP Auto Routed Trans Karine Stevens MD [[id number]] 10/31/2013  5:23 PM 10/31/2013 ALVA Rodriguez Phone: [phone] In Basket Notes/Transcriptions Constantino Quesada MD [[id number]] 11/26/2013 11:24 AM 11/26/2013 Alvaro Rodriguez MD  
Fax: [phone] Phone: [phone] Fax IP Auto Routed MD Alicia [facility]on [facility] 12/10/2014  2:36 PM 12/10/2014 Raven Mora MD  
Phone: [phone] In [employer] IP Auto Routed Geoff Aguilar MD [[id number]] 1/19/2017  6:27 PM 01/19/2017

## 2017-01-19 NOTE — PERIOP NOTES
Pt cont to wait for floor bed. Ordered supper tray for patient and found his girlfriend to come and wait with him.

## 2017-01-19 NOTE — BRIEF OP NOTE
BRIEF OPERATIVE NOTE    Date of Procedure: 1/19/2017   Preoperative Diagnosis: Lower urinary tract symptoms [R39.9]  Benign nodular prostatic hyperplasia, presence of lower urinary tract symptoms unspecified [N40.0]  Bladder calculus [N21.0]  Postoperative Diagnosis: * No post-op diagnosis entered *    Procedure(s):  CYSTO LITHOLAPAXY  CYSTOSCOPY TRANSURETHRAL RESECTION OF PROSTATE  Surgeon(s) and Role:     * Jn Lopez MD - Primary            Surgical Staff:  Circ-1: Josh Burton RN  Circ-Relief: Lino Yusuf RN  Event Time In   Incision Start 1233   Incision Close      Anesthesia: General   Estimated Blood Loss: 100 ml  Specimens: * No specimens in log *   Findings: see op note   Complications: none  Implants: * No implants in log *

## 2017-01-20 PROBLEM — N21.0 BLADDER STONE: Status: ACTIVE | Noted: 2017-01-20

## 2017-01-20 LAB
HCT VFR BLD AUTO: 40.1 % (ref 41.1–50.3)
HGB BLD-MCNC: 13.4 G/DL (ref 13.6–17.2)

## 2017-01-20 PROCEDURE — 74011250636 HC RX REV CODE- 250/636: Performed by: UROLOGY

## 2017-01-20 PROCEDURE — 99218 HC RM OBSERVATION: CPT

## 2017-01-20 PROCEDURE — 36415 COLL VENOUS BLD VENIPUNCTURE: CPT | Performed by: UROLOGY

## 2017-01-20 PROCEDURE — 51700 IRRIGATION OF BLADDER: CPT

## 2017-01-20 PROCEDURE — 77030018836 HC SOL IRR NACL ICUM -A

## 2017-01-20 PROCEDURE — 74011000258 HC RX REV CODE- 258: Performed by: UROLOGY

## 2017-01-20 PROCEDURE — 85018 HEMOGLOBIN: CPT | Performed by: UROLOGY

## 2017-01-20 RX ADMIN — Medication 10 ML: at 22:00

## 2017-01-20 RX ADMIN — Medication 10 ML: at 05:51

## 2017-01-20 RX ADMIN — Medication 10 ML: at 14:00

## 2017-01-20 RX ADMIN — RAMIPRIL 5 MG: 5 CAPSULE ORAL at 07:00

## 2017-01-20 RX ADMIN — CEFAZOLIN SODIUM 1 G: 1 INJECTION, POWDER, FOR SOLUTION INTRAMUSCULAR; INTRAVENOUS at 05:46

## 2017-01-20 RX ADMIN — CARVEDILOL 25 MG: 25 TABLET ORAL at 18:04

## 2017-01-20 RX ADMIN — RAMIPRIL 5 MG: 5 CAPSULE ORAL at 05:54

## 2017-01-20 RX ADMIN — CARVEDILOL 25 MG: 25 TABLET ORAL at 11:30

## 2017-01-20 NOTE — PROGRESS NOTES
Subjective:   Daily Progress Note: 2017 7:27 AM  No Complaints  Objective:     Visit Vitals    /74    Pulse 76    Temp 98.6 °F (37 °C)    Resp 18    SpO2 96%    O2 Flow Rate (L/min): 3 l/min O2 Device: Room air    Temp (24hrs), Av.6 °F (37 °C), Min:97.6 °F (36.4 °C), Max:100 °F (37.8 °C)      1901 - 700  In: 4300 [I.V.:1300]  Out: 47663   701 - 1900  In: -   Out:      [unfilled]  [unfilled]  [unfilled]    Exam: urine clearing with CBI      Data Review    Recent Results (from the past 24 hour(s))   METABOLIC PANEL, BASIC    Collection Time: 17  8:50 PM   Result Value Ref Range    Sodium 142 136 - 145 mmol/L    Potassium 3.7 3.5 - 5.1 mmol/L    Chloride 105 98 - 107 mmol/L    CO2 28 21 - 32 mmol/L    Anion gap 9 7 - 16 mmol/L    Glucose 99 65 - 100 mg/dL    BUN 22 8 - 23 MG/DL    Creatinine 1.11 0.8 - 1.5 MG/DL    GFR est AA >60 >60 ml/min/1.73m2    GFR est non-AA >60 >60 ml/min/1.73m2    Calcium 8.0 (L) 8.3 - 10.4 MG/DL   HGB & HCT    Collection Time: 17  8:50 PM   Result Value Ref Range    HGB 13.5 (L) 13.6 - 17.2 g/dL    HCT 40.4 (L) 41.1 - 50.3 %   HGB & HCT    Collection Time: 17  5:24 AM   Result Value Ref Range    HGB 13.4 (L) 13.6 - 17.2 g/dL    HCT 40.1 (L) 41.1 - 50.3 %       Assessment   Principal Problem:    BPH (benign prostatic hyperplasia) (10/24/2013)      Overview: Date of Procedure: 2017       Preoperative Diagnosis: Lower urinary tract symptoms [R39.9]      Benign nodular prostatic hyperplasia, presence of lower urinary tract       symptoms unspecified [N40.0]      Bladder calculus [N21.0]      Postoperative Diagnosis: * No post-op diagnosis entered *       Procedure(s):      CYSTO LITHOLAPAXY      CYSTOSCOPY TRANSURETHRAL RESECTION OF PROSTATE    Active Problems:    Bladder stone (2017)        Plan:  Had large TURP, will leave littlejohn until tomorrow.

## 2017-01-20 NOTE — PROGRESS NOTES
Care Management Interventions  PCP Verified by CM:  Dago Vergara MD)  Transition of Care Consult (CM Consult): Discharge Planning (Pt is normally independent with ADL's and reports that he has support if needed.  )  Discharge Durable Medical Equipment: No  Physical Therapy Consult: No  Occupational Therapy Consult: No  Speech Therapy Consult: No  Current Support Network: Own Home  Confirm Follow Up Transport: Self  Plan discussed with Pt/Family/Caregiver: Yes  Freedom of Choice Offered: Yes  Discharge Location  Discharge Placement: Home (LMSW met with pt. Pt anticipated to return home with no supportive care needs. Pt in Out Pt/Observation status. Reviewed  and signed Out Pt/Observation letter with copt to chart.   Pt  has drug plan with pharmacy listed.)

## 2017-01-21 VITALS
TEMPERATURE: 98 F | DIASTOLIC BLOOD PRESSURE: 84 MMHG | SYSTOLIC BLOOD PRESSURE: 146 MMHG | HEART RATE: 80 BPM | OXYGEN SATURATION: 98 % | RESPIRATION RATE: 18 BRPM

## 2017-01-21 PROCEDURE — 77030018836 HC SOL IRR NACL ICUM -A

## 2017-01-21 PROCEDURE — 99218 HC RM OBSERVATION: CPT

## 2017-01-21 RX ORDER — PHENAZOPYRIDINE HYDROCHLORIDE 200 MG/1
200 TABLET, FILM COATED ORAL
Qty: 9 TAB | Refills: 0 | Status: SHIPPED | OUTPATIENT
Start: 2017-01-21 | End: 2017-01-24

## 2017-01-21 RX ORDER — NITROFURANTOIN (MACROCRYSTALS) 100 MG/1
100 CAPSULE ORAL 2 TIMES DAILY
Qty: 6 CAP | Refills: 0 | Status: SHIPPED | OUTPATIENT
Start: 2017-01-21 | End: 2017-02-14

## 2017-01-21 RX ADMIN — Medication 10 ML: at 06:00

## 2017-01-21 NOTE — DISCHARGE INSTRUCTIONS
Transurethral Resection of the Prostate (TURP): Before Your Surgery  What is transurethral resection of the prostate? Transurethral resection of the prostate (TURP) is surgery to take out a part of your prostate gland. It is done when the prostate gets too large. The prostate gland is a small organ just below a man's bladder. It makes most of the fluid in semen. It surrounds the urethra, the tube that carries urine from the bladder out of the body through the penis. Your doctor will give you medicine to make you sleep or feel relaxed. If you are awake during the surgery, you will get medicine to numb you from the chest down. You will not feel pain. The doctor puts a thin, lighted tube into your urethra. This is called a resectoscope or scope. It goes in through the opening in your penis. Then the doctor puts small surgical tools through the scope. These are used to remove the part of the prostate that is blocking urine flow. When the doctor is finished, he or she takes out the scope. This surgery may make it easier for you to urinate. You may have better control when you start and stop your urine stream. And you may feel like you get more relief when you urinate. Most men go home from the hospital 1 or 2 days after surgery. You may be able to go back to work or most of your usual routine in 1 to 3 weeks. But for about 6 weeks, you will need to avoid heavy lifting and activities that might put extra pressure on your bladder. Follow-up care is a key part of your treatment and safety. Be sure to make and go to all appointments, and call your doctor if you are having problems. It's also a good idea to know your test results and keep a list of the medicines you take. What happens before surgery? Surgery can be stressful. This information will help you understand what you can expect. And it will help you safely prepare for your surgery.   Preparing for surgery  · Understand exactly what surgery is planned, along with the risks, benefits, and other options. · Tell your doctors ALL the medicines, vitamins, supplements, and herbal remedies you take. Some of these can increase the risk of bleeding or interact with anesthesia. · If you take blood thinners, such as warfarin (Coumadin), clopidogrel (Plavix), or aspirin, be sure to talk to your doctor. He or she will tell you if you should stop taking these medicines before your surgery. Make sure that you understand exactly what your doctor wants you to do. · Your doctor will tell you which medicines to take or stop before your surgery. You may need to stop taking certain medicines a week or more before surgery. So talk to your doctor as soon as you can. · If you have an advance directive, let your doctor know. It may include a living will and a durable power of  for health care. Bring a copy to the hospital. If you don't have one, you may want to prepare one. It lets your doctor and loved ones know your health care wishes. Doctors advise that everyone prepare these papers before any type of surgery or procedure. · You may need to empty your bowels with an enema or laxative. Your doctor will tell you how to do this. What happens on the day of surgery? · Follow the instructions exactly about when to stop eating and drinking. If you don't, your surgery may be canceled. If your doctor told you to take your medicines on the day of surgery, take them with only a sip of water. · Take a bath or shower before you come in for your surgery. Do not apply lotions, perfumes, deodorants, or nail polish. · Do not shave the surgical site yourself. · Take off all jewelry and piercings. And take out contact lenses, if you wear them. At the hospital or surgery center  · Bring a picture ID. · The area for surgery is often marked to make sure there are no errors. · You will be kept comfortable and safe by your anesthesia provider. The anesthesia may make you sleep. Or it may just numb the area being worked on. · The surgery will take 1 to 2 hours. Going home  · Be sure you have someone to drive you home. Anesthesia and pain medicine make it unsafe for you to drive. · You will be given more specific instructions about recovering from your surgery. They will cover things like diet, wound care, follow-up care, driving, and getting back to your normal routine. · You may go home with a urinary catheter in place. A urinary catheter is a flexible plastic tube used to drain urine from your bladder when you cannot urinate on your own. Your doctor will give you instructions about how to care for your catheter and when it can be removed. When should you call your doctor? · You have questions or concerns. · You don't understand how to prepare for your surgery. · You become ill before the surgery (such as fever, flu, or a cold). · You need to reschedule or have changed your mind about having the surgery. Where can you learn more? Go to http://ojse-willa.info/. Enter S295 in the search box to learn more about \"Transurethral Resection of the Prostate (TURP): Before Your Surgery. \"  Current as of: May 24, 2016  Content Version: 11.1  © 4322-1894 [employer]. Care instructions adapted under license by [employer] (which disclaims liability or warranty for this information). If you have questions about a medical condition or this instruction, always ask your healthcare professional. [place] [address] any warranty or liability for your use of this information. DISCHARGE SUMMARY from Nurse    The following personal items are in your possession at time of discharge:    Dental Appliances: None  Visual Aid: Glasses     Home Medications: None  Jewelry: None  Clothing:  Footwear, Jacket/Coat, Pants, Shirt, Socks, Undergarments  Other Valuables: Cell Phone, Eyeglasses             PATIENT INSTRUCTIONS:    After general anesthesia or intravenous sedation, for 24 hours or while taking prescription Narcotics:  · Limit your activities  · Do not drive and operate hazardous machinery  · Do not make important personal or business decisions  · Do  not drink alcoholic beverages  · If you have not urinated within 8 hours after discharge, please contact your surgeon on call. Report the following to your surgeon:  · Excessive pain, swelling, redness or odor of or around the surgical area  · Temperature over 100.5  · Nausea and vomiting lasting longer than 4 hours or if unable to take medications  · Any signs of decreased circulation or nerve impairment to extremity: change in color, persistent  numbness, tingling, coldness or increase pain  · Any questions        What to do at Home:  Recommended activity: Activity as tolerated, ***    If you experience any of the following symptoms- Fever greater than 101 or unable to void. Bloody urine , please follow up with Dr. Simin Farfan. *  Please give a list of your current medications to your Primary Care Provider. *  Please update this list whenever your medications are discontinued, doses are      changed, or new medications (including over-the-counter products) are added. *  Please carry medication information at all times in case of emergency situations. These are general instructions for a healthy lifestyle:    No smoking/ No tobacco products/ Avoid exposure to second hand smoke    Surgeon General's Warning:  Quitting smoking now greatly reduces serious risk to your health.     Obesity, smoking, and sedentary lifestyle greatly increases your risk for illness    A healthy diet, regular physical exercise & weight monitoring are important for maintaining a healthy lifestyle    You may be retaining fluid if you have a history of heart failure or if you experience any of the following symptoms:  Weight gain of 3 pounds or more overnight or 5 pounds in a week, increased swelling in our hands or feet or shortness of breath while lying flat in bed. Please call your doctor as soon as you notice any of these symptoms; do not wait until your next office visit. Recognize signs and symptoms of STROKE:    F-face looks uneven    A-arms unable to move or move unevenly    S-speech slurred or non-existent    T-time-call 911 as soon as signs and symptoms begin-DO NOT go       Back to bed or wait to see if you get better-TIME IS BRAIN. Warning Signs of HEART ATTACK     Call 911 if you have these symptoms:   Chest discomfort. Most heart attacks involve discomfort in the center of the chest that lasts more than a few minutes, or that goes away and comes back. It can feel like uncomfortable pressure, squeezing, fullness, or pain.  Discomfort in other areas of the upper body. Symptoms can include pain or discomfort in one or both arms, the back, neck, jaw, or stomach.  Shortness of breath with or without chest discomfort.  Other signs may include breaking out in a cold sweat, nausea, or lightheadedness. Don't wait more than five minutes to call 911 - MINUTES MATTER! Fast action can save your life. Calling 911 is almost always the fastest way to get lifesaving treatment. Emergency Medical Services staff can begin treatment when they arrive -- up to an hour sooner than if someone gets to the hospital by car. The discharge information has been reviewed with the patient. The patient verbalized understanding. Discharge medications reviewed with the patient and appropriate educational materials and side effects teaching were provided.

## 2017-01-21 NOTE — PROGRESS NOTES
DISCHARGE NOTE:  Feels well  AFVSS  Urine slight blood tinge  Imp: Stable s/p TURP  Plan: Remove littlejohn--discharge today

## 2017-01-21 NOTE — PROGRESS NOTES
END OF SHIFT NOTE:    INTAKE/OUTPUT  01/19 0701 - 01/20 0700  In: 4300 [I.V.:1300]  Out: 39084   Voiding: YES  Catheter: YES  Color: clear  Drain:   Bladder Irrigation/CBI 01/19/17 (Active)   Volume (ml) 3000 mL 1/20/2017  7:19 PM   Total Volume Out (mL) 3500 ml 1/20/2017  7:19 PM   Status Draining 1/20/2017  1:56 PM   Site Condition No abnormalities 1/20/2017  1:56 PM   Drainage Tube Clipped to Bed Yes 1/20/2017  1:56 PM   Catheter Secured to Thigh Yes 1/20/2017  1:56 PM   Tamper Seal Intact Yes 1/20/2017  1:56 PM   Bag Below Bladder/Not on Floor Yes 1/20/2017  1:56 PM   Lack of Dependent Loop in Tubing Yes 1/20/2017  1:56 PM   Drainage Bag Less Than Half Full Yes 1/20/2017  1:56 PM   Sterile Solution Used for  Irrigation No 1/20/2017  7:30 AM               DIET  regular    Flatus: Patient does have flatus present. Stool:  0 occurrences. Characteristics:       Ambulating  NO    Emesis: 0 occurrences.     Characteristics:          VITAL SIGNS  Patient Vitals for the past 12 hrs:   Temp Pulse Resp BP SpO2   01/20/17 1919 98.1 °F (36.7 °C) 84 18 140/75 93 %   01/20/17 1457 98.8 °F (37.1 °C) 90 18 126/76 96 %   01/20/17 1142 98.6 °F (37 °C) 85 18 150/83 96 %       Pain Assessment  Pain Intensity 1: 0 (01/20/17 0720)  Pain Location 1: Penis  Pain Intervention(s) 1: Medication (see MAR)  Patient Stated Pain Goal: 0            Erica Lin RN

## 2018-06-21 PROBLEM — Z01.818 PRE-OP EVALUATION: Status: ACTIVE | Noted: 2018-06-21

## 2020-07-24 NOTE — OP NOTES
How Severe Is Your Sunburn?: moderate Viru 65   OPERATIVE REPORT       Name:  Fern Oro   MR#:  [id number]   :  1940   Account #:  [de-identified]   Date of Adm:  2017       DATE OF SURGERY: 2017    PREOPERATIVE DIAGNOSES   1. Bladder calculus. 2. Benign prostatic hypertrophy. POSTOPERATIVE DIAGNOSES   1. Bladder calculus. 2. Benign prostatic hypertrophy. NAME OF PROCEDURE   1. Cystoscopy. 2. Holmium laser lithotripsy of bladder calculus, transurethral   resection of prostate. SURGEON: Shashank Matta. Viki Rehman MD.    ANESTHESIA: General.      SPECIMEN: TURP chips. FINDINGS: A spiculated stone measuring 2 cm on the bladder   floor, very large prostate with trilobar hypertrophy producing   obstruction. DESCRIPTION OF PROCEDURE: The patient was given a general   anesthetic with LMA, placed in dorsal lithotomy position. He was   prepped and draped in sterile fashion. I inserted a 25-Guamanian   cystoscope sheath into the urethra using the video camera and 30   degree lens. The urethra was normal. Prostate shows a very high   median lobe with a long prostatic fossa. The scope was passed   over the median lobe into the bladder. There was 2+ trabeculation. A spiculated stone noted on the   floor of the bladder just behind the median lobe. It measures   about 2 cm in diameter. A 900 micron holmium laser fiber was used at a power setting of   1.4 joules, 8 Hz. We began to fragment the stone. It was   partially fragmented. At this point, the median lobe began to   bleed as it was fairly friable. Visualization was very difficult   at this point. The irrigant did not run well because of   the patient's reduced bladder capacity. At this point, we decided to do the TURP. A 26-Guamanian   continuous-flow resectoscope sheath was passed over an obturator   into the bladder. We used the resectoscope with 30 degree lens. Resection was begun at the bladder neck posteriorly.  The median   lobe was Additional History: Patient stated the burn & blisters lasted for four days. [place] 10 [place] and didn’t apply sunscreen. resected down to the surgical capsule. It was very large   median lobe. The verumontanum was easily visualized and we   carried the resection posteriorly out of the verumontanum. The   patient was placed in moderate Trendelenburg position to allow   the chips to float back into the bladder. We then resected the   right lateral lobe from the bladder neck to the verumontanum. The left side was resected in a similar fashion and there was   also some anterior tissue which was resected. The prostate, as   noted, was very large. During the procedure, the stone was washed   down to the prostatic fossa and we broke it up with the end of   the scope. I was able to remove the stone during the TURP in   this fashion. The prostatic fossa was inspected and hemostasis was achieved   using the coag loop. There was a good open channel   circumferentially. The chips were irrigated out of the bladder. Both ureteral orifices were visible and there was no evidence of   any stone or chips remaining in the bladder. We took care to   achieve hemostasis. At this point, a 25 [place] through 3-way [place] was inserted over   a catheter guide in the bladder. The balloon was inflated with   30 mL of water and connected to continuous bladder irrigation. He was taken to the recovery room in stable condition. PLAN: He will be admitted for observation.         MD Kami Esquivel / Arnaud Edwards   D:  01/19/2017   13:58   T:  01/19/2017   15:10   Job #:  [id number]

## 2022-03-18 PROBLEM — N21.0 BLADDER STONE: Status: ACTIVE | Noted: 2017-01-20

## 2022-03-20 PROBLEM — Z01.818 PRE-OP EVALUATION: Status: ACTIVE | Noted: 2018-06-21

## 2022-06-14 ENCOUNTER — TELEPHONE (OUTPATIENT)
Dept: CARDIOLOGY CLINIC | Age: 82
End: 2022-06-14

## 2022-06-14 RX ORDER — AMLODIPINE BESYLATE 5 MG/1
5 TABLET ORAL DAILY
Qty: 30 TABLET | Refills: 11 | Status: SHIPPED | OUTPATIENT
Start: 2022-06-14

## 2022-06-14 RX ORDER — RAMIPRIL 10 MG/1
10 CAPSULE ORAL DAILY
Qty: 30 CAPSULE | Refills: 11 | Status: SHIPPED | OUTPATIENT
Start: 2022-06-14

## 2022-06-14 NOTE — TELEPHONE ENCOUNTER
/170 when he woke up yesterday. Was very dizzy and weak. Took medications and drank lots of water and it seemed to help some. Today he woke up with BP of 177/126. Experiencing a tight feeling in his head.

## 2022-06-14 NOTE — TELEPHONE ENCOUNTER
BP yesterday upon waking was 220/155 and he could barely walk and top of head had squeezing sensation. He sat drank water took meds and things got better. This am /145 and has pressure in his head again today. This is very unusual for him. He said this is very abnormal for him. I advised he head to the ER but he wants to take his am meds first and see if BP comes down. Note routed to  for advice      Meds Are Altace 5mg qday and Coreg 25mg bid.

## 2022-06-14 NOTE — TELEPHONE ENCOUNTER
I called and informed pt. of MD response and he v/u. Meds escribed as below  Requested Prescriptions     Signed Prescriptions Disp Refills    ramipril (ALTACE) 10 MG capsule 30 capsule 11     Sig: Take 1 capsule by mouth daily     Authorizing Provider: Mane Knight     Ordering User: CHER GALE    amLODIPine (NORVASC) 5 MG tablet 30 tablet 11     Sig: Take 1 tablet by mouth daily     Authorizing Provider: Mane Knight     Ordering User: CHER GALE

## 2022-06-18 ENCOUNTER — HOSPITAL ENCOUNTER (EMERGENCY)
Dept: CT IMAGING | Age: 82
Discharge: HOME | End: 2022-06-21
Payer: MEDICARE

## 2022-06-18 ENCOUNTER — HOSPITAL ENCOUNTER (EMERGENCY)
Age: 82
Discharge: HOME | End: 2022-06-18
Attending: EMERGENCY MEDICINE
Payer: MEDICARE

## 2022-06-18 VITALS
SYSTOLIC BLOOD PRESSURE: 121 MMHG | RESPIRATION RATE: 16 BRPM | BODY MASS INDEX: 27.92 KG/M2 | OXYGEN SATURATION: 97 % | HEART RATE: 84 BPM | WEIGHT: 195 LBS | HEIGHT: 70 IN | TEMPERATURE: 98.2 F | DIASTOLIC BLOOD PRESSURE: 65 MMHG

## 2022-06-18 DIAGNOSIS — T07.XXXA ABRASIONS OF MULTIPLE SITES: ICD-10-CM
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
## 2022-06-18 DIAGNOSIS — S00.03XA CONTUSION OF SCALP, INITIAL ENCOUNTER: ICD-10-CM

## 2022-06-18 PROCEDURE — 70450 CT HEAD/BRAIN W/O DYE: CPT

## 2022-06-18 PROCEDURE — 99284 EMERGENCY DEPT VISIT MOD MDM: CPT

## 2022-06-18 ASSESSMENT — ENCOUNTER SYMPTOMS
BOWEL INCONTINENCE: 0
NAUSEA: 0
CONSTIPATION: 0
ABDOMINAL PAIN: 0
VOMITING: 0
DIARRHEA: 0
VISUAL CHANGE: 0

## 2022-06-18 ASSESSMENT — PAIN - FUNCTIONAL ASSESSMENT
PAIN_FUNCTIONAL_ASSESSMENT: 0-10
PAIN_FUNCTIONAL_ASSESSMENT: 0-10

## 2022-06-18 ASSESSMENT — PAIN SCALES - GENERAL
PAINLEVEL_OUTOF10: 2
PAINLEVEL_OUTOF10: 2

## 2022-06-18 ASSESSMENT — PAIN DESCRIPTION - LOCATION: LOCATION: HEAD

## 2022-06-18 NOTE — ED NOTES
I have reviewed discharge instructions with the patient and spouse. The patient and spouse verbalized understanding. Patient left ED via Discharge Method: ambulatory to Home with self. Opportunity for questions and clarification provided. Patient given 0 scripts. To continue your aftercare when you leave the hospital, you may receive an automated call from our care team to check in on how you are doing. This is a free service and part of our promise to provide the best care and service to meet your aftercare needs.  If you have questions, or wish to unsubscribe from this service please call [phone]. Thank you for Choosing our [facility] Emergency Department.           Danielle Concepcion RN  06/18/22 9988

## 2022-06-18 NOTE — ED TRIAGE NOTES
Patient was stepping backwards on concrete and started to fall, patient with abrasion wound to back of head, left forearm and right elbow. Patient denies taking any blood thinners. Denies any neck pain on palpations. Masked.

## 2022-06-18 NOTE — ED PROVIDER NOTES
[facility] Emergency Department Provider Note                   PCP:                Hill Vincent MD               Age: 81 y.o. Sex: male       ICD-10-CM    1. Fall, initial encounter  [address] XXXA    2. Contusion of scalp, initial encounter  S00. 03XA    3. Abrasions of multiple sites  T07Roni Taylor        DISPOSITION Decision To Discharge 06/18/2022 07:27:34 PM       Discharge Medication List as of 6/18/2022  7:29 PM          Orders Placed This Encounter   Yani Charles Do, MD, MD 11:14 PM      MDM  Number of Diagnoses or Management Options  Abrasions of multiple sites: new, no workup  Contusion of scalp, initial encounter: new, needed workup  Fall, initial encounter: new, needed workup     Amount and/or Complexity of Data Reviewed  Tests in the radiology section of CPT®: ordered and reviewed  Review and summarize past medical records: yes    Risk of Complications, Morbidity, and/or Mortality  Presenting problems: moderate  Diagnostic procedures: moderate  Management options: moderate    Patient Progress  Patient progress: improved       Shen Cost is a 81 y.o. male who presents to the Emergency Department with chief complaint of    Chief Complaint   Patient presents with    Fall      81year-old male with history of low back surgery with subsequent difficulties with balance presents to the emergency department complaining of sudden loss of balance falling backward and hitting his head on the concrete. Patient denies loss of consciousness, paralysis or paresthesias, but does describe a headache as well as abrasions to bilateral forearms. The history is provided by the patient. Fall  The accident occurred 1 to 2 hours ago. The fall occurred while standing. Distance fallen: Ground-level. He landed on concrete. The volume of blood lost was minimal. Point of impact: Bottom, head, bilateral forearms. The pain is present in the head. The pain is moderate.  He was ambulatory at the scene. There was no entrapment after the fall. There was no drug use involved in the accident. There was no alcohol use involved in the accident. Associated symptoms include headaches. Pertinent negatives include no visual change, no fever, no numbness, no abdominal pain, no bowel incontinence, no nausea, no vomiting, no hematuria, no hearing loss, no loss of consciousness and no tingling. The symptoms are aggravated by pressure on the injury. He has tried rest for the symptoms. The treatment provided mild relief. Review of Systems   Constitutional: Negative for chills and fever. Gastrointestinal: Negative for abdominal pain, bowel incontinence, constipation, diarrhea, nausea and vomiting. Genitourinary: Negative for hematuria. Neurological: Positive for headaches. Negative for tingling, loss of consciousness and numbness. All other systems reviewed and are negative.       Past Medical History:   Diagnosis Date    Arthritis     ASCVD (arteriosclerotic cardiovascular disease)     BPH (benign prostatic hyperplasia) 10/24/2013    CAD (coronary artery disease) 1987    stented x 4 then CABG x 2 in 10/23/13; multiple PCIs (4)stented in the 80's prior to the CABG    Cancer [facility])     skin    Chest pain     Cleft lip     Dyslipidemia     Fatigue     H/O sciatica     Heart failure ([facility] [address]     hx of CHF    Hypercholesterolemia     Hypertension     controlled with med    Myocardial infarction ([facility] [address]     Other ill-defined conditions(799.89) 11/2013    nightmares fell out of bed post CABG 10/2013    Other ill-defined conditions(799.89) 11/2013    thoracentesis    PUD (peptic ulcer disease) 1970    Skin cancer of forehead     Unspecified adverse effect of anesthesia     avoid left nostril nare per pt        Past Surgical History:   Procedure Laterality Date    CORONARY ARTERY BYPASS GRAFT      GI  1970s    surg for duodenal ulcer    HEENT  [id number]    cleft palate surg x18    ORTHOPEDIC SURGERY      internal fiaxation left wrist c plating    NC CARDIAC SURG PROCEDURE UNLIST  10/23/2013    CABG x2      PTCA      TONSILLECTOMY      TRANSURETHRAL RESECTION OF PROSTATE          Family History   Problem Relation Age of Onset    Lung Disease Father     Heart Disease Brother 39        MI    Cancer Mother         skin    Heart Disease Mother         degenerative- blockages/ no MI    Heart Disease Maternal Uncle            Social Connections:     Frequency of Communication with Friends and Family: Not on file    Frequency of Social Gatherings with Friends and Family: Not on file    Attends [employer] Services: Not on file    Active Member of Clubs or Organizations: Not on file    Attends Club or Organization Meetings: Not on file    Marital Status: Not on file        Allergies   Allergen Reactions    Ciprofloxacin Nausea Only    Metoprolol Other (See Comments)     BP erratic        Vitals signs and nursing note reviewed. Patient Vitals for the past 4 hrs:   Pulse Resp BP SpO2   06/18/22 1934 84 16 121/65 97 %          Physical Exam  Vitals and nursing note reviewed. Constitutional:       General: He is not in acute distress. HENT:      Head: Normocephalic. Abrasion and contusion present. No raccoon eyes, Sandoval's sign or masses. Jaw: There is normal jaw occlusion. Right Ear: External ear normal.      Left Ear: External ear normal.      Nose: Nose normal.      Mouth/Throat:      Mouth: Mucous membranes are moist.   Eyes:      Extraocular Movements: Extraocular movements intact. Conjunctiva/sclera: Conjunctivae normal.      Pupils: Pupils are equal, round, and reactive to light. Cardiovascular:      Rate and Rhythm: Normal rate and regular rhythm. Heart sounds: No murmur heard. Pulmonary:      Effort: Pulmonary effort is normal.      Breath sounds: Normal breath sounds.    Abdominal:      General: Bowel sounds are normal.      Palpations: Abdomen is

## 2022-07-13 ENCOUNTER — OFFICE VISIT (OUTPATIENT)
Dept: CARDIOLOGY CLINIC | Age: 82
End: 2022-07-13
Payer: MEDICARE

## 2022-07-13 VITALS
DIASTOLIC BLOOD PRESSURE: 78 MMHG | WEIGHT: 189.4 LBS | BODY MASS INDEX: 27.11 KG/M2 | HEIGHT: 70 IN | HEART RATE: 68 BPM | SYSTOLIC BLOOD PRESSURE: 116 MMHG

## 2022-07-13 DIAGNOSIS — I25.10 CORONARY ARTERY DISEASE INVOLVING NATIVE CORONARY ARTERY OF NATIVE HEART, UNSPECIFIED WHETHER ANGINA PRESENT: ICD-10-CM

## 2022-07-13 DIAGNOSIS — I10 PRIMARY HYPERTENSION: ICD-10-CM

## 2022-07-13 DIAGNOSIS — R07.2 PRECORDIAL PAIN: Primary | ICD-10-CM

## 2022-07-13 PROCEDURE — 1036F TOBACCO NON-USER: CPT | Performed by: INTERNAL MEDICINE

## 2022-07-13 PROCEDURE — G8417 CALC BMI ABV UP PARAM F/U: HCPCS | Performed by: INTERNAL MEDICINE

## 2022-07-13 PROCEDURE — G8428 CUR MEDS NOT DOCUMENT: HCPCS | Performed by: INTERNAL MEDICINE

## 2022-07-13 PROCEDURE — 99214 OFFICE O/P EST MOD 30 MIN: CPT | Performed by: INTERNAL MEDICINE

## 2022-07-13 PROCEDURE — 1123F ACP DISCUSS/DSCN MKR DOCD: CPT | Performed by: INTERNAL MEDICINE

## 2022-07-13 RX ORDER — ASPIRIN 81 MG/1
81 TABLET ORAL DAILY
COMMUNITY

## 2023-01-17 ENCOUNTER — OFFICE VISIT (OUTPATIENT)
Dept: CARDIOLOGY CLINIC | Age: 83
End: 2023-01-17
Payer: COMMERCIAL

## 2023-01-17 VITALS
WEIGHT: 187 LBS | HEIGHT: 70 IN | BODY MASS INDEX: 26.77 KG/M2 | SYSTOLIC BLOOD PRESSURE: 102 MMHG | DIASTOLIC BLOOD PRESSURE: 70 MMHG | HEART RATE: 68 BPM

## 2023-01-17 DIAGNOSIS — I25.10 ASCVD (ARTERIOSCLEROTIC CARDIOVASCULAR DISEASE): Primary | ICD-10-CM

## 2023-01-17 DIAGNOSIS — E78.5 DYSLIPIDEMIA, GOAL LDL BELOW 70: ICD-10-CM

## 2023-01-17 DIAGNOSIS — I10 PRIMARY HYPERTENSION: ICD-10-CM

## 2023-01-17 PROCEDURE — 99214 OFFICE O/P EST MOD 30 MIN: CPT | Performed by: INTERNAL MEDICINE

## 2023-01-17 PROCEDURE — 3078F DIAST BP <80 MM HG: CPT | Performed by: INTERNAL MEDICINE

## 2023-01-17 PROCEDURE — 1123F ACP DISCUSS/DSCN MKR DOCD: CPT | Performed by: INTERNAL MEDICINE

## 2023-01-17 PROCEDURE — 3074F SYST BP LT 130 MM HG: CPT | Performed by: INTERNAL MEDICINE

## 2023-01-17 RX ORDER — ATORVASTATIN CALCIUM 80 MG/1
80 TABLET, FILM COATED ORAL DAILY
Qty: 90 TABLET | Refills: 3 | Status: SHIPPED | OUTPATIENT
Start: 2023-01-17

## 2023-01-17 NOTE — PROGRESS NOTES
[facility] CARDIOLOGY  [address], [address] [facility], [address] [place] [address]  PHONE: [phone]        23        NAME:  Mere Burks Sr.  : 1940  MRN: [id number]     CHIEF COMPLAINT:    Coronary Artery Disease    SUBJECTIVE:     No chest pain or palpitation or dizziness. Mild sob w exertion x 2 months. No chest pain. Medications were all reviewed with the patient today and updated as necessary. Current Outpatient Medications   Medication Sig    atorvastatin (LIPITOR) 80 MG tablet Take 1 tablet by mouth daily    aspirin 81 MG EC tablet Take 81 mg by mouth daily    ramipril (ALTACE) 10 MG capsule Take 1 capsule by mouth daily (Patient taking differently: Take 10 mg by mouth daily 10 mg pm and 5 mg am)    amLODIPine (NORVASC) 5 MG tablet Take 1 tablet by mouth daily    carvedilol (COREG) 25 MG tablet Take 25 mg by mouth 2 times daily (with meals)    Cholecalciferol 50 MCG (2000 UT) TABS Take by mouth daily    cyanocobalamin 1000 MCG tablet Take 1,000 mcg by mouth daily    escitalopram (LEXAPRO) 10 MG tablet Take 5 mg by mouth daily     No current facility-administered medications for this visit. Allergies   Allergen Reactions    Atorvastatin      Other reaction(s): Myalgia-Intolerance    Ciprofloxacin Nausea Only    Metoprolol Other (See Comments)     BP erratic           PHYSICAL EXAM:     Wt Readings from Last 3 Encounters:   23 187 lb (84.8 kg)   22 189 lb 6.4 oz (85.9 kg)   22 195 lb (88.5 kg)     BP Readings from Last 3 Encounters:   23 102/70   22 116/78   22 121/65       /70   Pulse 68   Ht 5' 10\" (1.778 m)   Wt 187 lb (84.8 kg)   BMI 26.83 kg/m²     Physical Exam  Vitals reviewed. HENT:      Head: Normocephalic and atraumatic. Eyes:      Extraocular Movements: Extraocular movements intact. Pupils: Pupils are equal, round, and reactive to light. Cardiovascular:      Rate and Rhythm: Normal rate.       Heart sounds: Normal heart sounds. Pulmonary:      Effort: Pulmonary effort is normal.      Breath sounds: Normal breath sounds. Abdominal:      General: Abdomen is flat. Palpations: Abdomen is soft. There is no mass. Musculoskeletal:         General: Normal range of motion. Cervical back: Normal range of motion. Skin:     General: Skin is warm and dry. Neurological:      General: No focal deficit present. Mental Status: He is alert and oriented to person, place, and time. Psychiatric:         Mood and Affect: Mood normal.         RECENT LABS AND RECORDS REVIEW    Creat 1.0, t chol 220      ASSESSMENT and PLAN    Hesham Doll was seen today for coronary artery disease. Diagnoses and all orders for this visit:    ASCVD (arteriosclerotic cardiovascular disease)    Primary hypertension    Dyslipidemia, goal LDL below 70  -     atorvastatin (LIPITOR) 80 MG tablet; Take 1 tablet by mouth daily     ////    CV status seems stable. LDL not at goal  Inc atova to 80    Medications and most recent labs reviewed. Diet and exercise are encouraged. Greater  than 50% of today's visit was devoted to counseling the patient, explaining disease concepts and offering advice and suggestions for optimal care. Return in about 6 months (around 7/17/2023).        Nasreen Monique MD  1/17/2023  1:23 PM

## 2023-05-18 ENCOUNTER — TELEPHONE (OUTPATIENT)
Age: 83
End: 2023-05-18

## 2023-05-18 DIAGNOSIS — I10 HTN (HYPERTENSION): ICD-10-CM

## 2023-05-18 DIAGNOSIS — I48.91 ATRIAL FIBRILLATION (HCC): ICD-10-CM

## 2023-05-18 DIAGNOSIS — I25.10 CAD (CORONARY ARTERY DISEASE): Primary | ICD-10-CM

## 2023-05-18 NOTE — TELEPHONE ENCOUNTER
Pt.notified of MD response. STRECHO order placed. Note sent to scheduling to schedule STRECHO and fu after w/.

## 2023-05-18 NOTE — TELEPHONE ENCOUNTER
In [place] and had pain in arm and jaw. He was seen in [place] ER and told his blood was too thick. They found he had a MI but no damage. He was told he was dehydrated. He did not have a heart cath. He refused a cath and said that he told them he was going to see  about all of that. They let him go home. He currently has no CP. He is at home taking it easy. He did drop records off at the office which have been scanned into Epic for  to review and advise what steps are needed next.

## 2023-05-18 NOTE — TELEPHONE ENCOUNTER
Pt drops by report of being in ED earlier this week at [facility]) of which he declined being admitted. (Report is in Dr. Michelle Elizabeth in-box, now.)    Pt is sweating, but has no other symptoms. Pt wanted to see Dr. Daniel Bryson, who will return on Monday, but without symptoms didn't want to see a nurse. Sending to Triage to follow-up. (It seemed he thought another doctor should review his notes and see him.)    Thank you.

## 2023-05-19 ENCOUNTER — TELEPHONE (OUTPATIENT)
Age: 83
End: 2023-05-19

## 2023-05-24 ENCOUNTER — TELEPHONE (OUTPATIENT)
Age: 83
End: 2023-05-24

## 2023-05-24 NOTE — TELEPHONE ENCOUNTER
Was in [facility] and had a heart attack and insisted on coming home to have a stress Echo and see Dr Ezra Webb his appointment was mad for more than a month away and they wanted him seen ASAP Please call

## 2023-05-24 NOTE — TELEPHONE ENCOUNTER
Called s/w pt. Pt wanted verification that Dr Leandro Tapia received and reviewed paper work from On license of [facility]). I explained to pt that usually paper work dropped off will at our office will go to the MD's MA. Will send note to Dima Jensen so that she can verify that paperwork was received and reviewed by Dr Leandro Tapia. Pt can be reached on cell. Denies CP or SOB at this time.

## 2023-05-25 NOTE — TELEPHONE ENCOUNTER
I called the pt and let him know that the Dr Cathy Hamilton had reviewed the paperwork from the ER and does not feel like his stress echo or appointment needs to be moved to a sooner date. I told the pt if he experienced any chest pain, shortness of breath or symptoms that sent him to the ER originally to go back to the ER.  Pt voiced understanding

## 2023-06-01 ENCOUNTER — TELEPHONE (OUTPATIENT)
Age: 83
End: 2023-06-01

## 2023-06-01 NOTE — TELEPHONE ENCOUNTER
Patient did call back on 06/01/2023. Note was already closed so could not be added to first note. Patient  was discharged from [facility] after having open heart surgery. Patient is requesting to talk to Dr. Raisa Anders directly.
10-Dec-2018 09:27

## 2023-06-01 NOTE — TELEPHONE ENCOUNTER
[facility] Called saying patient is being dismissed there today 06/01/2023. Nurse said patient had an echo, nuclear stress test & heart cath while in the hospitalized there. The appointments on the schedule for any of those procedures may need to be canceled. Please research.

## 2023-06-08 ENCOUNTER — OFFICE VISIT (OUTPATIENT)
Age: 83
End: 2023-06-08
Payer: COMMERCIAL

## 2023-06-08 VITALS
WEIGHT: 187 LBS | HEIGHT: 70 IN | DIASTOLIC BLOOD PRESSURE: 76 MMHG | SYSTOLIC BLOOD PRESSURE: 130 MMHG | HEART RATE: 70 BPM | BODY MASS INDEX: 26.77 KG/M2

## 2023-06-08 DIAGNOSIS — I25.10 ASCVD (ARTERIOSCLEROTIC CARDIOVASCULAR DISEASE): Primary | ICD-10-CM

## 2023-06-08 DIAGNOSIS — I10 PRIMARY HYPERTENSION: ICD-10-CM

## 2023-06-08 PROCEDURE — 93000 ELECTROCARDIOGRAM COMPLETE: CPT | Performed by: INTERNAL MEDICINE

## 2023-06-08 PROCEDURE — 3078F DIAST BP <80 MM HG: CPT | Performed by: INTERNAL MEDICINE

## 2023-06-08 PROCEDURE — 1123F ACP DISCUSS/DSCN MKR DOCD: CPT | Performed by: INTERNAL MEDICINE

## 2023-06-08 PROCEDURE — 99214 OFFICE O/P EST MOD 30 MIN: CPT | Performed by: INTERNAL MEDICINE

## 2023-06-08 PROCEDURE — 3075F SYST BP GE 130 - 139MM HG: CPT | Performed by: INTERNAL MEDICINE

## 2023-06-08 RX ORDER — CLOPIDOGREL BISULFATE 75 MG/1
75 TABLET ORAL DAILY
Qty: 30 TABLET | Refills: 11 | COMMUNITY
Start: 2023-06-02 | End: 2024-05-27

## 2023-06-08 RX ORDER — PANTOPRAZOLE SODIUM 40 MG/1
40 TABLET, DELAYED RELEASE ORAL DAILY
Qty: 30 TABLET | Refills: 0 | COMMUNITY
Start: 2023-06-02 | End: 2023-07-02

## 2023-06-08 NOTE — PROGRESS NOTES
kg/m²     Physical Exam  Vitals reviewed. HENT:      Head: Normocephalic and atraumatic. Eyes:      Extraocular Movements: Extraocular movements intact. Pupils: Pupils are equal, round, and reactive to light. Cardiovascular:      Rate and Rhythm: Normal rate. Heart sounds: Normal heart sounds. Pulmonary:      Effort: Pulmonary effort is normal.      Breath sounds: Normal breath sounds. Abdominal:      General: Abdomen is flat. Palpations: Abdomen is soft. There is no mass. Musculoskeletal:         General: Normal range of motion. Cervical back: Normal range of motion. Skin:     General: Skin is warm and dry. Neurological:      General: No focal deficit present. Mental Status: He is alert and oriented to person, place, and time. Psychiatric:         Mood and Affect: Mood normal.         RECENT LABS AND RECORDS REVIEW          ASSESSMENT and PLAN    Samantha Mcgill was seen today for coronary artery disease. Diagnoses and all orders for this visit:    ASCVD (arteriosclerotic cardiovascular disease)  Doing well. Continue current rx but drop asa on Eliquis. Refer to rehab. Primary hypertension  Doing well. Continue current rx. Other orders  -     EKG 12 lead  -     \[facility]\"" Cardiopulmonary Rehabilitation       Return in about 6 months (around 12/8/2023).        Estefanía Campos MD  6/8/2023  9:53 PM

## 2023-06-20 ENCOUNTER — HOSPITAL ENCOUNTER (OUTPATIENT)
Dept: CARDIAC REHAB | Age: 83
Setting detail: RECURRING SERIES
Discharge: HOME | End: 2023-06-23

## 2023-06-20 ASSESSMENT — PATIENT HEALTH QUESTIONNAIRE - PHQ9
8. MOVING OR SPEAKING SO SLOWLY THAT OTHER PEOPLE COULD HAVE NOTICED. OR THE OPPOSITE, BEING SO FIGETY OR RESTLESS THAT YOU HAVE BEEN MOVING AROUND A LOT MORE THAN USUAL: 0
5. POOR APPETITE OR OVEREATING: 0
2. FEELING DOWN, DEPRESSED OR HOPELESS: 1
9. THOUGHTS THAT YOU WOULD BE BETTER OFF DEAD, OR OF HURTING YOURSELF: 0
1. LITTLE INTEREST OR PLEASURE IN DOING THINGS: 1
SUM OF ALL RESPONSES TO PHQ QUESTIONS 1-9: 7
SUM OF ALL RESPONSES TO PHQ QUESTIONS 1-9: 7
SUM OF ALL RESPONSES TO PHQ9 QUESTIONS 1 & 2: 2
3. TROUBLE FALLING OR STAYING ASLEEP: 0
7. TROUBLE CONCENTRATING ON THINGS, SUCH AS READING THE NEWSPAPER OR WATCHING TELEVISION: 2
6. FEELING BAD ABOUT YOURSELF - OR THAT YOU ARE A FAILURE OR HAVE LET YOURSELF OR YOUR FAMILY DOWN: 0
10. IF YOU CHECKED OFF ANY PROBLEMS, HOW DIFFICULT HAVE THESE PROBLEMS MADE IT FOR YOU TO DO YOUR WORK, TAKE CARE OF THINGS AT HOME, OR GET ALONG WITH OTHER PEOPLE: 1
SUM OF ALL RESPONSES TO PHQ QUESTIONS 1-9: 7
4. FEELING TIRED OR HAVING LITTLE ENERGY: 3
SUM OF ALL RESPONSES TO PHQ QUESTIONS 1-9: 7

## 2023-06-20 ASSESSMENT — LIFESTYLE VARIABLES: SMOKELESS_TOBACCO: NO

## 2023-06-20 ASSESSMENT — EJECTION FRACTION: EF_VALUE: 40

## 2023-06-20 NOTE — CARDIO/PULMONARY
Dear Dr. Kelvin Allen:    Thank you for referring your patient, Zenaida Morales (: 1940), to the Cardiopulmonary Rehabilitation Program at [facility].  Mr. Delia Mccann is a good candidate for the Cardiac Rehab Program and should see improvements with regular participation. We will be addressing appropriate interventions for modifiable risk factors with your patient during the next 12 weeks. We will contact you with any issues or concerns that may arise, or you can follow your patients progress through [facility] at any time. A final summary will be available in [facility] when the program is completed. Again, thank you for your referral. If we can be of further assistance, please feel free to contact the Cardiopulmonary Rehab staff at [phone].     Sincerely,    CINDY FarnsworthN, RN  Cardiopulmonary Rehabilitation Nurse  [facility]
Family

## 2023-06-26 ENCOUNTER — APPOINTMENT (OUTPATIENT)
Dept: CARDIAC REHAB | Age: 83
End: 2023-06-26
Payer: COMMERCIAL

## 2023-06-27 ENCOUNTER — HOSPITAL ENCOUNTER (OUTPATIENT)
Dept: CARDIAC REHAB | Age: 83
Setting detail: RECURRING SERIES
Discharge: HOME | End: 2023-06-30
Payer: COMMERCIAL

## 2023-06-27 VITALS — WEIGHT: 198.8 LBS | HEIGHT: 69 IN | OXYGEN SATURATION: 98 % | BODY MASS INDEX: 29.44 KG/M2

## 2023-06-27 PROCEDURE — 93798 PHYS/QHP OP CAR RHAB W/ECG: CPT

## 2023-06-27 ASSESSMENT — EXERCISE STRESS TEST
PEAK_BP: 140/78
PEAK_HR: 89
PEAK_BP: 140/78
PEAK_RPE: 11
PEAK_BP: 140/78
PEAK_METS: 2.3
PEAK_HR: 93

## 2023-06-27 ASSESSMENT — EJECTION FRACTION
EF_VALUE: 40
EF_VALUE: 40

## 2023-06-27 ASSESSMENT — LIFESTYLE VARIABLES: SMOKELESS_TOBACCO: NO

## 2023-06-28 ENCOUNTER — HOSPITAL ENCOUNTER (OUTPATIENT)
Dept: CARDIAC REHAB | Age: 83
Setting detail: RECURRING SERIES
Discharge: HOME | End: 2023-07-01
Payer: COMMERCIAL

## 2023-06-28 PROCEDURE — 93798 PHYS/QHP OP CAR RHAB W/ECG: CPT

## 2023-06-28 ASSESSMENT — EXERCISE STRESS TEST
PEAK_HR: 106
PEAK_BP: 118/80

## 2023-06-29 DIAGNOSIS — I10 HTN (HYPERTENSION): Primary | ICD-10-CM

## 2023-06-30 RX ORDER — RAMIPRIL 10 MG/1
CAPSULE ORAL
Qty: 30 CAPSULE | Refills: 11 | Status: SHIPPED | OUTPATIENT
Start: 2023-06-30

## 2023-07-05 ENCOUNTER — TELEPHONE (OUTPATIENT)
Dept: CARDIAC REHAB | Age: 83
End: 2023-07-05

## 2023-07-19 ENCOUNTER — TELEPHONE (OUTPATIENT)
Dept: CARDIAC REHAB | Age: 83
End: 2023-07-19

## 2023-07-19 NOTE — TELEPHONE ENCOUNTER
Spoke with patient regarding attendance at cardiac rehab. Has an appt with orthopedic surgeon today about knee.

## 2023-08-03 ENCOUNTER — TELEPHONE (OUTPATIENT)
Age: 83
End: 2023-08-03

## 2023-08-03 NOTE — TELEPHONE ENCOUNTER
Pt would like a call back for clarification about his current med list. Pt specifically has questions about his atorvastatin.

## 2023-08-04 RX ORDER — EZETIMIBE 10 MG/1
TABLET ORAL
COMMUNITY
Start: 2023-05-21

## 2023-08-04 RX ORDER — NITROGLYCERIN 0.4 MG/1
TABLET SUBLINGUAL
COMMUNITY
Start: 2023-06-15

## 2023-08-04 NOTE — TELEPHONE ENCOUNTER
Spoke with pt, he wanted to know if he was supposed to take atorvastatin. Stated he has not taken it in a couple of days. I told him that it is on his med list but I also see it listed as an allergy. He stated he is also taking zetia 10 mg and that amlodipine 5 mg was discontinued by [facility]. Per [facility] note 5/29/2023 amlodipine was discontinued. I told him that I would talk to Dr Marly Toledo and get back to him.

## 2023-09-11 ENCOUNTER — OFFICE VISIT (OUTPATIENT)
Age: 83
End: 2023-09-11
Payer: MEDICARE

## 2023-09-11 VITALS
HEIGHT: 70 IN | HEART RATE: 68 BPM | BODY MASS INDEX: 28.63 KG/M2 | WEIGHT: 200 LBS | DIASTOLIC BLOOD PRESSURE: 80 MMHG | SYSTOLIC BLOOD PRESSURE: 126 MMHG

## 2023-09-11 DIAGNOSIS — I51.9 LV DYSFUNCTION: ICD-10-CM

## 2023-09-11 DIAGNOSIS — I25.10 ASCVD (ARTERIOSCLEROTIC CARDIOVASCULAR DISEASE): Primary | ICD-10-CM

## 2023-09-11 DIAGNOSIS — I10 PRIMARY HYPERTENSION: ICD-10-CM

## 2023-09-11 DIAGNOSIS — Z01.818 PRE-OP EVALUATION: ICD-10-CM

## 2023-09-11 DIAGNOSIS — I25.10 ASCVD (ARTERIOSCLEROTIC CARDIOVASCULAR DISEASE): ICD-10-CM

## 2023-09-11 LAB
BASOPHILS # BLD: 0 K/UL (ref 0–0.2)
BASOPHILS NFR BLD: 0 % (ref 0–2)
CHOLEST SERPL-MCNC: 119 MG/DL
DIFFERENTIAL METHOD BLD: NORMAL
EOSINOPHIL # BLD: 0.1 K/UL (ref 0–0.8)
EOSINOPHIL NFR BLD: 1 % (ref 0.5–7.8)
ERYTHROCYTE [DISTWIDTH] IN BLOOD BY AUTOMATED COUNT: 13.9 % (ref 11.9–14.6)
HCT VFR BLD AUTO: 48 % (ref 41.1–50.3)
HDLC SERPL-MCNC: 33 MG/DL (ref 40–60)
HDLC SERPL: 3.6
HGB BLD-MCNC: 15.7 G/DL (ref 13.6–17.2)
IMM GRANULOCYTES # BLD AUTO: 0 K/UL (ref 0–0.5)
IMM GRANULOCYTES NFR BLD AUTO: 0 % (ref 0–5)
LDLC SERPL CALC-MCNC: 63.6 MG/DL
LYMPHOCYTES # BLD: 1.7 K/UL (ref 0.5–4.6)
LYMPHOCYTES NFR BLD: 19 % (ref 13–44)
MCH RBC QN AUTO: 29.5 PG (ref 26.1–32.9)
MCHC RBC AUTO-ENTMCNC: 32.7 G/DL (ref 31.4–35)
MCV RBC AUTO: 90.2 FL (ref 82–102)
MONOCYTES # BLD: 0.7 K/UL (ref 0.1–1.3)
MONOCYTES NFR BLD: 8 % (ref 4–12)
NEUTS SEG # BLD: 6.4 K/UL (ref 1.7–8.2)
NEUTS SEG NFR BLD: 72 % (ref 43–78)
NRBC # BLD: 0 K/UL (ref 0–0.2)
PLATELET # BLD AUTO: 156 K/UL (ref 150–450)
PMV BLD AUTO: 9.6 FL (ref 9.4–12.3)
RBC # BLD AUTO: 5.32 M/UL (ref 4.23–5.6)
TRIGL SERPL-MCNC: 112 MG/DL (ref 35–150)
VLDLC SERPL CALC-MCNC: 22.4 MG/DL (ref 6–23)
WBC # BLD AUTO: 8.9 K/UL (ref 4.3–11.1)

## 2023-09-11 PROCEDURE — G8417 CALC BMI ABV UP PARAM F/U: HCPCS | Performed by: INTERNAL MEDICINE

## 2023-09-11 PROCEDURE — G8428 CUR MEDS NOT DOCUMENT: HCPCS | Performed by: INTERNAL MEDICINE

## 2023-09-11 PROCEDURE — 3074F SYST BP LT 130 MM HG: CPT | Performed by: INTERNAL MEDICINE

## 2023-09-11 PROCEDURE — 1123F ACP DISCUSS/DSCN MKR DOCD: CPT | Performed by: INTERNAL MEDICINE

## 2023-09-11 PROCEDURE — 99214 OFFICE O/P EST MOD 30 MIN: CPT | Performed by: INTERNAL MEDICINE

## 2023-09-11 PROCEDURE — 3079F DIAST BP 80-89 MM HG: CPT | Performed by: INTERNAL MEDICINE

## 2023-09-11 PROCEDURE — 1036F TOBACCO NON-USER: CPT | Performed by: INTERNAL MEDICINE

## 2023-09-24 ENCOUNTER — TELEPHONE (OUTPATIENT)
Dept: CARDIOLOGY CLINIC | Age: 83
End: 2023-09-24

## 2023-11-06 ENCOUNTER — TELEPHONE (OUTPATIENT)
Age: 83
End: 2023-11-06

## 2023-11-06 NOTE — TELEPHONE ENCOUNTER
Please call pt he needs to go over his med with someone ,because he does not know what he should be taking

## 2023-11-27 ENCOUNTER — TELEPHONE (OUTPATIENT)
Age: 83
End: 2023-11-27

## 2023-11-27 NOTE — TELEPHONE ENCOUNTER
Pt called in stating he is having extreme fatigue sob just weak and coughing up flum not able to complete normal act w/o break no other symptoms.

## 2023-11-27 NOTE — TELEPHONE ENCOUNTER
Patient called stating that he was really SOB, fatigue. Patient states that he is on the verge of something and wants an EKG. No chest pain. Patient advised to go to the ER at [facility] or to an urgent care center for evaluation.  Patient voiced understanding and thanked me//luis

## 2023-11-28 ENCOUNTER — TELEPHONE (OUTPATIENT)
Age: 83
End: 2023-11-28

## 2023-11-28 ENCOUNTER — HOSPITAL ENCOUNTER (OUTPATIENT)
Age: 83
Setting detail: OBSERVATION
LOS: 1 days | Discharge: HOME | End: 2023-11-29
Attending: INTERNAL MEDICINE | Admitting: INTERNAL MEDICINE
Payer: MEDICARE

## 2023-11-28 DIAGNOSIS — R07.9 CHEST PAIN: ICD-10-CM

## 2023-11-28 DIAGNOSIS — I20.0 UNSTABLE ANGINA (HCC): ICD-10-CM

## 2023-11-28 DIAGNOSIS — I25.10 CORONARY ARTERY DISEASE INVOLVING NATIVE CORONARY ARTERY OF NATIVE HEART, UNSPECIFIED WHETHER ANGINA PRESENT: Primary | ICD-10-CM

## 2023-11-28 LAB
ANION GAP SERPL CALC-SCNC: 7 MMOL/L (ref 2–11)
BASOPHILS # BLD: 0 K/UL (ref 0–0.2)
BASOPHILS NFR BLD: 0 % (ref 0–2)
BUN SERPL-MCNC: 33 MG/DL (ref 8–23)
CALCIUM SERPL-MCNC: 8.8 MG/DL (ref 8.3–10.4)
CHLORIDE SERPL-SCNC: 112 MMOL/L (ref 101–110)
CO2 SERPL-SCNC: 23 MMOL/L (ref 21–32)
CREAT SERPL-MCNC: 1.1 MG/DL (ref 0.8–1.5)
DIFFERENTIAL METHOD BLD: ABNORMAL
EKG ATRIAL RATE: 97 BPM
EKG DIAGNOSIS: NORMAL
EKG P AXIS: 34 DEGREES
EKG P-R INTERVAL: 128 MS
EKG Q-T INTERVAL: 358 MS
EKG QRS DURATION: 96 MS
EKG QTC CALCULATION (BAZETT): 454 MS
EKG R AXIS: -39 DEGREES
EKG T AXIS: 65 DEGREES
EKG VENTRICULAR RATE: 97 BPM
EOSINOPHIL # BLD: 0.2 K/UL (ref 0–0.8)
EOSINOPHIL NFR BLD: 2 % (ref 0.5–7.8)
ERYTHROCYTE [DISTWIDTH] IN BLOOD BY AUTOMATED COUNT: 14.2 % (ref 11.9–14.6)
GLUCOSE SERPL-MCNC: 98 MG/DL (ref 65–100)
HCT VFR BLD AUTO: 47.9 % (ref 41.1–50.3)
HGB BLD-MCNC: 15.8 G/DL (ref 13.6–17.2)
IMM GRANULOCYTES # BLD AUTO: 0.1 K/UL (ref 0–0.5)
IMM GRANULOCYTES NFR BLD AUTO: 1 % (ref 0–5)
LYMPHOCYTES # BLD: 2.7 K/UL (ref 0.5–4.6)
LYMPHOCYTES NFR BLD: 20 % (ref 13–44)
MAGNESIUM SERPL-MCNC: 2.3 MG/DL (ref 1.8–2.4)
MCH RBC QN AUTO: 30.7 PG (ref 26.1–32.9)
MCHC RBC AUTO-ENTMCNC: 33 G/DL (ref 31.4–35)
MCV RBC AUTO: 93 FL (ref 82–102)
MONOCYTES # BLD: 1.1 K/UL (ref 0.1–1.3)
MONOCYTES NFR BLD: 8 % (ref 4–12)
NEUTS SEG # BLD: 9.5 K/UL (ref 1.7–8.2)
NEUTS SEG NFR BLD: 69 % (ref 43–78)
NRBC # BLD: 0 K/UL (ref 0–0.2)
PLATELET # BLD AUTO: 149 K/UL (ref 150–450)
PMV BLD AUTO: 9.9 FL (ref 9.4–12.3)
POTASSIUM SERPL-SCNC: 4.1 MMOL/L (ref 3.5–5.1)
RBC # BLD AUTO: 5.15 M/UL (ref 4.23–5.6)
SODIUM SERPL-SCNC: 142 MMOL/L (ref 133–143)
WBC # BLD AUTO: 13.6 K/UL (ref 4.3–11.1)

## 2023-11-28 PROCEDURE — 36415 COLL VENOUS BLD VENIPUNCTURE: CPT

## 2023-11-28 PROCEDURE — 93010 ELECTROCARDIOGRAM REPORT: CPT | Performed by: INTERNAL MEDICINE

## 2023-11-28 PROCEDURE — 80048 BASIC METABOLIC PNL TOTAL CA: CPT

## 2023-11-28 PROCEDURE — 93005 ELECTROCARDIOGRAM TRACING: CPT | Performed by: NURSE PRACTITIONER

## 2023-11-28 PROCEDURE — 85025 COMPLETE CBC W/AUTO DIFF WBC: CPT

## 2023-11-28 PROCEDURE — 99222 1ST HOSP IP/OBS MODERATE 55: CPT | Performed by: INTERNAL MEDICINE

## 2023-11-28 PROCEDURE — 6370000000 HC RX 637 (ALT 250 FOR IP): Performed by: NURSE PRACTITIONER

## 2023-11-28 PROCEDURE — 2580000003 HC RX 258: Performed by: NURSE PRACTITIONER

## 2023-11-28 PROCEDURE — G0378 HOSPITAL OBSERVATION PER HR: HCPCS

## 2023-11-28 PROCEDURE — 83735 ASSAY OF MAGNESIUM: CPT

## 2023-11-28 RX ORDER — CARVEDILOL 25 MG/1
25 TABLET ORAL 2 TIMES DAILY WITH MEALS
Status: DISCONTINUED | OUTPATIENT
Start: 2023-11-28 | End: 2023-11-29 | Stop reason: HOSPADM

## 2023-11-28 RX ORDER — ATORVASTATIN CALCIUM 80 MG/1
80 TABLET, FILM COATED ORAL DAILY
Status: DISCONTINUED | OUTPATIENT
Start: 2023-11-29 | End: 2023-11-29 | Stop reason: HOSPADM

## 2023-11-28 RX ORDER — POTASSIUM CHLORIDE 20 MEQ/1
40 TABLET, EXTENDED RELEASE ORAL PRN
Status: DISCONTINUED | OUTPATIENT
Start: 2023-11-28 | End: 2023-11-29 | Stop reason: HOSPADM

## 2023-11-28 RX ORDER — EZETIMIBE 10 MG/1
10 TABLET ORAL NIGHTLY
Status: DISCONTINUED | OUTPATIENT
Start: 2023-11-28 | End: 2023-11-29 | Stop reason: HOSPADM

## 2023-11-28 RX ORDER — ACETAMINOPHEN 325 MG/1
650 TABLET ORAL EVERY 6 HOURS PRN
Status: DISCONTINUED | OUTPATIENT
Start: 2023-11-28 | End: 2023-11-29 | Stop reason: HOSPADM

## 2023-11-28 RX ORDER — ONDANSETRON 2 MG/ML
4 INJECTION INTRAMUSCULAR; INTRAVENOUS EVERY 6 HOURS PRN
Status: DISCONTINUED | OUTPATIENT
Start: 2023-11-28 | End: 2023-11-29 | Stop reason: HOSPADM

## 2023-11-28 RX ORDER — NITROGLYCERIN 0.4 MG/1
0.4 TABLET SUBLINGUAL EVERY 5 MIN PRN
Status: DISCONTINUED | OUTPATIENT
Start: 2023-11-28 | End: 2023-11-29 | Stop reason: HOSPADM

## 2023-11-28 RX ORDER — ONDANSETRON 4 MG/1
4 TABLET, ORALLY DISINTEGRATING ORAL EVERY 8 HOURS PRN
Status: DISCONTINUED | OUTPATIENT
Start: 2023-11-28 | End: 2023-11-29 | Stop reason: HOSPADM

## 2023-11-28 RX ORDER — ESCITALOPRAM OXALATE 10 MG/1
5 TABLET ORAL DAILY
Status: DISCONTINUED | OUTPATIENT
Start: 2023-11-29 | End: 2023-11-29 | Stop reason: HOSPADM

## 2023-11-28 RX ORDER — SODIUM CHLORIDE 0.9 % (FLUSH) 0.9 %
5-40 SYRINGE (ML) INJECTION PRN
Status: DISCONTINUED | OUTPATIENT
Start: 2023-11-28 | End: 2023-11-29 | Stop reason: HOSPADM

## 2023-11-28 RX ORDER — ASPIRIN 81 MG/1
81 TABLET, CHEWABLE ORAL DAILY
Status: DISCONTINUED | OUTPATIENT
Start: 2023-11-29 | End: 2023-11-29 | Stop reason: HOSPADM

## 2023-11-28 RX ORDER — CLOPIDOGREL BISULFATE 75 MG/1
75 TABLET ORAL DAILY
Status: DISCONTINUED | OUTPATIENT
Start: 2023-11-29 | End: 2023-11-29 | Stop reason: HOSPADM

## 2023-11-28 RX ORDER — POTASSIUM CHLORIDE 7.45 MG/ML
10 INJECTION INTRAVENOUS PRN
Status: DISCONTINUED | OUTPATIENT
Start: 2023-11-28 | End: 2023-11-29 | Stop reason: HOSPADM

## 2023-11-28 RX ORDER — SODIUM CHLORIDE 0.9 % (FLUSH) 0.9 %
5-40 SYRINGE (ML) INJECTION EVERY 12 HOURS SCHEDULED
Status: DISCONTINUED | OUTPATIENT
Start: 2023-11-28 | End: 2023-11-29 | Stop reason: HOSPADM

## 2023-11-28 RX ORDER — MAGNESIUM SULFATE IN WATER 40 MG/ML
2000 INJECTION, SOLUTION INTRAVENOUS PRN
Status: DISCONTINUED | OUTPATIENT
Start: 2023-11-28 | End: 2023-11-29 | Stop reason: HOSPADM

## 2023-11-28 RX ORDER — LISINOPRIL 20 MG/1
20 TABLET ORAL DAILY
Status: DISCONTINUED | OUTPATIENT
Start: 2023-11-29 | End: 2023-11-29 | Stop reason: HOSPADM

## 2023-11-28 RX ORDER — SODIUM CHLORIDE 9 MG/ML
INJECTION, SOLUTION INTRAVENOUS PRN
Status: DISCONTINUED | OUTPATIENT
Start: 2023-11-28 | End: 2023-11-29 | Stop reason: HOSPADM

## 2023-11-28 RX ORDER — SODIUM CHLORIDE 9 MG/ML
INJECTION, SOLUTION INTRAVENOUS CONTINUOUS
Status: DISCONTINUED | OUTPATIENT
Start: 2023-11-29 | End: 2023-11-29 | Stop reason: HOSPADM

## 2023-11-28 RX ORDER — POLYETHYLENE GLYCOL 3350 17 G/17G
17 POWDER, FOR SOLUTION ORAL DAILY PRN
Status: DISCONTINUED | OUTPATIENT
Start: 2023-11-28 | End: 2023-11-29 | Stop reason: HOSPADM

## 2023-11-28 RX ADMIN — CARVEDILOL 25 MG: 25 TABLET, FILM COATED ORAL at 18:05

## 2023-11-28 RX ADMIN — SODIUM CHLORIDE, PRESERVATIVE FREE 10 ML: 5 INJECTION INTRAVENOUS at 20:13

## 2023-11-28 ASSESSMENT — ENCOUNTER SYMPTOMS
SHORTNESS OF BREATH: 1
ALLERGIC/IMMUNOLOGIC NEGATIVE: 1
EYES NEGATIVE: 1
GASTROINTESTINAL NEGATIVE: 1

## 2023-11-28 NOTE — H&P
[facility] Cardiology History & Physical      Date of  Admission: No admission date for patient encounter. Primary Care Physician:  Saud Jimenez MD  Primary Cardiologist:  Dr. Gomez Lawai Physician:  Dr. Cherylene Bolognese     CC:  chest pain     HPI:  Gretta Cox is a 81 y.o. male with PMH of CAD with CABG (C 6/2023 LIMA to LAD patent & SVG to OM patent, MYRA x2 to RCA), LV thrombus on Echo 6/2023, HTN, HLD, and BPH, who presents as a direct admit for fatigue, chest pain and shortness of breath. The chest pain is described as tightness. He notes chest pain and BOSWELL with exertion. EKG shows SR with PVC, no ST elevation noted. On exam he is resting comfortably. Labs are pending.       Past Medical History:   Diagnosis Date    Arthritis     ASCVD (arteriosclerotic cardiovascular disease)     BPH (benign prostatic hyperplasia) 10/24/2013    CAD (coronary artery disease) 1987    stented x 4 then CABG x 2 in 10/23/13; multiple PCIs (4)stented in the 80's prior to the CABG    Cancer [facility])     skin    Chest pain     CHF (congestive heart failure) ([address])     Cleft lip     Dyslipidemia     Fatigue     H/O sciatica     Heart failure (HCC)     hx of CHF    Hx of blood clots     Hypercholesterolemia     Hypertension     controlled with med    Myocardial infarction [facility])     Other ill-defined conditions(799.89) 11/2013    nightmares fell out of bed post CABG 10/2013    Other ill-defined conditions(799.89) 11/2013    thoracentesis    PUD (peptic ulcer disease) 1970    Skin cancer of forehead     Unspecified adverse effect of anesthesia     avoid left nostril nare per pt      Past Surgical History:   Procedure Laterality Date    CORONARY ARTERY BYPASS GRAFT      GI  1970s    surg for duodenal ulcer    HEENT  7901-4222    cleft palate surg x18    ORTHOPEDIC SURGERY      internal fiaxation left wrist c plating    CO UNLISTED PROCEDURE CARDIAC SURGERY  10/23/2013    CABG x2      PTCA      TONSILLECTOMY      TURP

## 2023-11-28 NOTE — TELEPHONE ENCOUNTER
Patient called stating he is experiencing the following symptoms :    SOB with little exertion  No energy  Lethargy  Abnormal amount of phlegm being produced  Has odd looking EKG per patient  No CP

## 2023-11-28 NOTE — TELEPHONE ENCOUNTER
Having a lot of SOB,low energy,and no energy,having a lot of phlegm. Even SOB lying in bed. He does not feel ill. Has no swelling. He does have some chest tightness. He says  knows him well and he would like to know what  thinks he needs to do. I did offer an appt.today in [place] which pt.declined.

## 2023-11-28 NOTE — TELEPHONE ENCOUNTER
Esuebio Clemons MD  You Just now (11:44 AM)       I spoke to the patient in the bed coordinator at [facility]  and I am going to have him admitted to a monitored bed. Instructions: This plan will send the code FBSE to the PM system.  DO NOT or CHANGE the price. Price (Do Not Change): 0.00 Detail Level: Simple

## 2023-11-29 ENCOUNTER — APPOINTMENT (OUTPATIENT)
Dept: NON INVASIVE DIAGNOSTICS | Age: 83
End: 2023-11-29
Attending: INTERNAL MEDICINE
Payer: MEDICARE

## 2023-11-29 VITALS
SYSTOLIC BLOOD PRESSURE: 101 MMHG | OXYGEN SATURATION: 98 % | BODY MASS INDEX: 27.59 KG/M2 | HEART RATE: 62 BPM | WEIGHT: 192.7 LBS | TEMPERATURE: 97.4 F | HEIGHT: 70 IN | RESPIRATION RATE: 18 BRPM | DIASTOLIC BLOOD PRESSURE: 64 MMHG

## 2023-11-29 LAB
ANION GAP SERPL CALC-SCNC: 8 MMOL/L (ref 2–11)
BASOPHILS # BLD: 0 K/UL (ref 0–0.2)
BASOPHILS NFR BLD: 0 % (ref 0–2)
BUN SERPL-MCNC: 30 MG/DL (ref 8–23)
CALCIUM SERPL-MCNC: 8.6 MG/DL (ref 8.3–10.4)
CHLORIDE SERPL-SCNC: 107 MMOL/L (ref 101–110)
CO2 SERPL-SCNC: 26 MMOL/L (ref 21–32)
CREAT SERPL-MCNC: 1 MG/DL (ref 0.8–1.5)
D DIMER PPP FEU-MCNC: <0.27 UG/ML(FEU)
DIFFERENTIAL METHOD BLD: ABNORMAL
ECHO AO ASC DIAM: 3.3 CM
ECHO AO ASCENDING AORTA INDEX: 1.61 CM/M2
ECHO AO ROOT DIAM: 3 CM
ECHO AO ROOT INDEX: 1.46 CM/M2
ECHO AR MAX VEL PISA: 3.6 M/S
ECHO AV AREA PEAK VELOCITY: 2.3 CM2
ECHO AV AREA VTI: 2.3 CM2
ECHO AV AREA/BSA PEAK VELOCITY: 1.1 CM2/M2
ECHO AV AREA/BSA VTI: 1.1 CM2/M2
ECHO AV MEAN GRADIENT: 5 MMHG
ECHO AV MEAN VELOCITY: 1.1 M/S
ECHO AV PEAK GRADIENT: 9 MMHG
ECHO AV PEAK VELOCITY: 1.5 M/S
ECHO AV REGURGITANT PHT: 451 MS
ECHO AV VELOCITY RATIO: 0.73
ECHO AV VTI: 34.3 CM
ECHO BSA: 2.08 M2
ECHO BSA: 2.08 M2
ECHO EST RA PRESSURE: 3 MMHG
ECHO IVC PROX: 0.9 CM
ECHO LA AREA 2C: 22 CM2
ECHO LA AREA 4C: 18 CM2
ECHO LA DIAMETER INDEX: 2.49 CM/M2
ECHO LA DIAMETER: 5.1 CM
ECHO LA MAJOR AXIS: 5.3 CM
ECHO LA MINOR AXIS: 5.9 CM
ECHO LA TO AORTIC ROOT RATIO: 1.7
ECHO LA VOL BP: 60 ML (ref 18–58)
ECHO LA VOL MOD A2C: 66 ML (ref 18–58)
ECHO LA VOL MOD A4C: 48 ML (ref 18–58)
ECHO LA VOL/BSA BIPLANE: 29 ML/M2 (ref 16–34)
ECHO LA VOLUME INDEX MOD A2C: 32 ML/M2 (ref 16–34)
ECHO LA VOLUME INDEX MOD A4C: 23 ML/M2 (ref 16–34)
ECHO LV E' LATERAL VELOCITY: 8 CM/S
ECHO LV E' SEPTAL VELOCITY: 6 CM/S
ECHO LV EDV A2C: 151 ML
ECHO LV EDV A4C: 160 ML
ECHO LV EDV INDEX A4C: 78 ML/M2
ECHO LV EDV NDEX A2C: 74 ML/M2
ECHO LV EJECTION FRACTION A2C: 51 %
ECHO LV EJECTION FRACTION A4C: 50 %
ECHO LV EJECTION FRACTION BIPLANE: 52 % (ref 55–100)
ECHO LV ESV A2C: 74 ML
ECHO LV ESV A4C: 80 ML
ECHO LV ESV INDEX A2C: 36 ML/M2
ECHO LV ESV INDEX A4C: 39 ML/M2
ECHO LV FRACTIONAL SHORTENING: 35 % (ref 28–44)
ECHO LV INTERNAL DIMENSION DIASTOLE INDEX: 2.49 CM/M2
ECHO LV INTERNAL DIMENSION DIASTOLIC: 5.1 CM (ref 4.2–5.9)
ECHO LV INTERNAL DIMENSION SYSTOLIC INDEX: 1.61 CM/M2
ECHO LV INTERNAL DIMENSION SYSTOLIC: 3.3 CM
ECHO LV IVSD: 1.2 CM (ref 0.6–1)
ECHO LV MASS 2D: 255.5 G (ref 88–224)
ECHO LV MASS INDEX 2D: 124.6 G/M2 (ref 49–115)
ECHO LV POSTERIOR WALL DIASTOLIC: 1.3 CM (ref 0.6–1)
ECHO LV RELATIVE WALL THICKNESS RATIO: 0.51
ECHO LVOT AREA: 3.1 CM2
ECHO LVOT AV VTI INDEX: 0.73
ECHO LVOT DIAM: 2 CM
ECHO LVOT MEAN GRADIENT: 2 MMHG
ECHO LVOT PEAK GRADIENT: 4 MMHG
ECHO LVOT PEAK VELOCITY: 1.1 M/S
ECHO LVOT STROKE VOLUME INDEX: 38.3 ML/M2
ECHO LVOT SV: 78.5 ML
ECHO LVOT VTI: 25 CM
ECHO MV A VELOCITY: 1 M/S
ECHO MV E DECELERATION TIME (DT): 272 MS
ECHO MV E VELOCITY: 0.78 M/S
ECHO MV E/A RATIO: 0.78
ECHO MV E/E' LATERAL: 9.75
ECHO MV E/E' RATIO (AVERAGED): 11.38
ECHO PULMONARY ARTERY END DIASTOLIC PRESSURE: 4 MMHG
ECHO PV ACCELERATION TIME (AT): 87 MS
ECHO PV MAX VELOCITY: 1 M/S
ECHO PV PEAK GRADIENT: 4 MMHG
ECHO PV REGURGITANT MAX VELOCITY: 1 M/S
ECHO RIGHT VENTRICULAR SYSTOLIC PRESSURE (RVSP): 28 MMHG
ECHO RV BASAL DIMENSION: 4.2 CM
ECHO RV FREE WALL PEAK S': 9 CM/S
ECHO RV TAPSE: 1.5 CM (ref 1.7–?)
ECHO TV REGURGITANT MAX VELOCITY: 2.48 M/S
ECHO TV REGURGITANT PEAK GRADIENT: 25 MMHG
EOSINOPHIL # BLD: 0.2 K/UL (ref 0–0.8)
EOSINOPHIL NFR BLD: 2 % (ref 0.5–7.8)
ERYTHROCYTE [DISTWIDTH] IN BLOOD BY AUTOMATED COUNT: 14 % (ref 11.9–14.6)
GLUCOSE SERPL-MCNC: 96 MG/DL (ref 65–100)
HCT VFR BLD AUTO: 45.6 % (ref 41.1–50.3)
HGB BLD-MCNC: 14.7 G/DL (ref 13.6–17.2)
IMM GRANULOCYTES # BLD AUTO: 0.1 K/UL (ref 0–0.5)
IMM GRANULOCYTES NFR BLD AUTO: 1 % (ref 0–5)
LYMPHOCYTES # BLD: 2.2 K/UL (ref 0.5–4.6)
LYMPHOCYTES NFR BLD: 22 % (ref 13–44)
MCH RBC QN AUTO: 30.5 PG (ref 26.1–32.9)
MCHC RBC AUTO-ENTMCNC: 32.2 G/DL (ref 31.4–35)
MCV RBC AUTO: 94.6 FL (ref 82–102)
MONOCYTES # BLD: 1 K/UL (ref 0.1–1.3)
MONOCYTES NFR BLD: 10 % (ref 4–12)
NEUTS SEG # BLD: 6.7 K/UL (ref 1.7–8.2)
NEUTS SEG NFR BLD: 65 % (ref 43–78)
NRBC # BLD: 0 K/UL (ref 0–0.2)
PLATELET # BLD AUTO: 124 K/UL (ref 150–450)
PMV BLD AUTO: 10.5 FL (ref 9.4–12.3)
POTASSIUM SERPL-SCNC: 4.3 MMOL/L (ref 3.5–5.1)
RBC # BLD AUTO: 4.82 M/UL (ref 4.23–5.6)
SODIUM SERPL-SCNC: 141 MMOL/L (ref 133–143)
WBC # BLD AUTO: 10.2 K/UL (ref 4.3–11.1)

## 2023-11-29 PROCEDURE — 93459 L HRT ART/GRFT ANGIO: CPT | Performed by: INTERNAL MEDICINE

## 2023-11-29 PROCEDURE — 6360000004 HC RX CONTRAST MEDICATION: Performed by: INTERNAL MEDICINE

## 2023-11-29 PROCEDURE — 99152 MOD SED SAME PHYS/QHP 5/>YRS: CPT | Performed by: INTERNAL MEDICINE

## 2023-11-29 PROCEDURE — 99153 MOD SED SAME PHYS/QHP EA: CPT | Performed by: INTERNAL MEDICINE

## 2023-11-29 PROCEDURE — 36415 COLL VENOUS BLD VENIPUNCTURE: CPT

## 2023-11-29 PROCEDURE — 85025 COMPLETE CBC W/AUTO DIFF WBC: CPT

## 2023-11-29 PROCEDURE — 2580000003 HC RX 258: Performed by: INTERNAL MEDICINE

## 2023-11-29 PROCEDURE — 2500000003 HC RX 250 WO HCPCS: Performed by: INTERNAL MEDICINE

## 2023-11-29 PROCEDURE — 85379 FIBRIN DEGRADATION QUANT: CPT

## 2023-11-29 PROCEDURE — 96361 HYDRATE IV INFUSION ADD-ON: CPT

## 2023-11-29 PROCEDURE — 2709999900 HC NON-CHARGEABLE SUPPLY: Performed by: INTERNAL MEDICINE

## 2023-11-29 PROCEDURE — 6370000000 HC RX 637 (ALT 250 FOR IP): Performed by: NURSE PRACTITIONER

## 2023-11-29 PROCEDURE — 2580000003 HC RX 258: Performed by: NURSE PRACTITIONER

## 2023-11-29 PROCEDURE — C8929 TTE W OR WO FOL WCON,DOPPLER: HCPCS

## 2023-11-29 PROCEDURE — 93306 TTE W/DOPPLER COMPLETE: CPT | Performed by: INTERNAL MEDICINE

## 2023-11-29 PROCEDURE — C1769 GUIDE WIRE: HCPCS | Performed by: INTERNAL MEDICINE

## 2023-11-29 PROCEDURE — 6360000002 HC RX W HCPCS: Performed by: INTERNAL MEDICINE

## 2023-11-29 PROCEDURE — 96360 HYDRATION IV INFUSION INIT: CPT

## 2023-11-29 PROCEDURE — G0378 HOSPITAL OBSERVATION PER HR: HCPCS

## 2023-11-29 PROCEDURE — 80048 BASIC METABOLIC PNL TOTAL CA: CPT

## 2023-11-29 PROCEDURE — C1894 INTRO/SHEATH, NON-LASER: HCPCS | Performed by: INTERNAL MEDICINE

## 2023-11-29 RX ORDER — HEPARIN SODIUM 200 [USP'U]/100ML
INJECTION, SOLUTION INTRAVENOUS CONTINUOUS PRN
Status: COMPLETED | OUTPATIENT
Start: 2023-11-29 | End: 2023-11-29

## 2023-11-29 RX ORDER — ASPIRIN 81 MG/1
81 TABLET ORAL DAILY
COMMUNITY
Start: 2023-11-29

## 2023-11-29 RX ORDER — MIDAZOLAM HYDROCHLORIDE 1 MG/ML
INJECTION INTRAMUSCULAR; INTRAVENOUS PRN
Status: DISCONTINUED | OUTPATIENT
Start: 2023-11-29 | End: 2023-11-29 | Stop reason: HOSPADM

## 2023-11-29 RX ORDER — LIDOCAINE HYDROCHLORIDE 10 MG/ML
INJECTION, SOLUTION INFILTRATION; PERINEURAL PRN
Status: DISCONTINUED | OUTPATIENT
Start: 2023-11-29 | End: 2023-11-29 | Stop reason: HOSPADM

## 2023-11-29 RX ADMIN — ATORVASTATIN CALCIUM 80 MG: 80 TABLET, FILM COATED ORAL at 09:00

## 2023-11-29 RX ADMIN — SODIUM CHLORIDE, PRESERVATIVE FREE 0.3 ML: 5 INJECTION INTRAVENOUS at 10:44

## 2023-11-29 RX ADMIN — CARVEDILOL 25 MG: 25 TABLET, FILM COATED ORAL at 09:00

## 2023-11-29 RX ADMIN — CLOPIDOGREL BISULFATE 75 MG: 75 TABLET ORAL at 07:42

## 2023-11-29 RX ADMIN — ESCITALOPRAM OXALATE 5 MG: 10 TABLET ORAL at 09:00

## 2023-11-29 RX ADMIN — SODIUM CHLORIDE: 9 INJECTION, SOLUTION INTRAVENOUS at 04:51

## 2023-11-29 RX ADMIN — SODIUM CHLORIDE, PRESERVATIVE FREE 10 ML: 5 INJECTION INTRAVENOUS at 09:02

## 2023-11-29 RX ADMIN — ASPIRIN 81 MG: 81 TABLET, CHEWABLE ORAL at 07:42

## 2023-11-29 RX ADMIN — LISINOPRIL 20 MG: 20 TABLET ORAL at 09:00

## 2023-11-29 NOTE — PROGRESS NOTES
Discharge instructions reviewed with patient. Prescriptions given for aspirin and med info sheets provided for all new medications. Opportunity for questions provided. Patient voiced understanding of all discharge instructions.

## 2023-11-29 NOTE — PLAN OF CARE
Problem: Discharge Planning  Goal: Discharge to home or other facility with appropriate resources  Outcome: Completed  Flowsheets (Taken 11/29/2023 0744)  Discharge to home or other facility with appropriate resources:   Identify barriers to discharge with patient and caregiver   Arrange for needed discharge resources and transportation as appropriate   Identify discharge learning needs (meds, wound care, etc)   Refer to discharge planning if patient needs post-hospital services based on physician order or complex needs related to functional status, cognitive ability or social support system     Problem: Safety - Adult  Goal: Free from fall injury  Outcome: Completed  Flowsheets (Taken 11/29/2023 0744)  Free From Fall Injury: Instruct family/caregiver on patient safety     Problem: ABCDS Injury Assessment  Goal: Absence of physical injury  Outcome: Completed  Flowsheets (Taken 11/29/2023 0744)  Absence of Physical Injury: Implement safety measures based on patient assessment     Problem: Chronic Conditions and Co-morbidities  Goal: Patient's chronic conditions and co-morbidity symptoms are monitored and maintained or improved  Outcome: Completed  Flowsheets (Taken 11/29/2023 0744)  Care Plan - Patient's Chronic Conditions and Co-Morbidity Symptoms are Monitored and Maintained or Improved: Monitor and assess patient's chronic conditions and comorbid symptoms for stability, deterioration, or improvement normal...

## 2023-11-29 NOTE — PROGRESS NOTES
Report received from  Cath Lab RN. Procedural finding communicated. Intra procedural medication administration reviewed. Progression of care discussed. Patient received into CPRU room 9, Post C    Access site without swelling. Left snuffbox, oozing. 2ml air added to band    Patient instructed to limit movement of left upper extremity. Routine post procedural vital signs & site assessment initiated.

## 2023-11-29 NOTE — PLAN OF CARE
Problem: Discharge Planning  Goal: Discharge to home or other facility with appropriate resources  Outcome: Progressing  Flowsheets (Taken 11/28/2023 1715 by Stacy Bernardo RN)  Discharge to home or other facility with appropriate resources:   Identify barriers to discharge with patient and caregiver   Arrange for needed discharge resources and transportation as appropriate   Identify discharge learning needs (meds, wound care, etc)   Refer to discharge planning if patient needs post-hospital services based on physician order or complex needs related to functional status, cognitive ability or social support system     Problem: Safety - Adult  Goal: Free from fall injury  Outcome: Progressing     Problem: ABCDS Injury Assessment  Goal: Absence of physical injury  Outcome: Progressing     Problem: Chronic Conditions and Co-morbidities  Goal: Patient's chronic conditions and co-morbidity symptoms are monitored and maintained or improved  Outcome: Progressing

## 2023-11-29 NOTE — CARE COORDINATION
Discharge order is in. Pt was a direct admit from home by Dr. Troy Lyons for complaints of chest pain and SOB. Pt underwent cardiac catheterization by Dr. Troy Lyons. Patient and tolerated the procedure well. Indep with his ADLs at baseline. On RA. PCP established. [employer] Medicare verified and able to afford home meds. No discharge needs identified. Tx goals met. 11/29/23 1346   Service Assessment   Patient Orientation Alert and Oriented   Cognition Alert   History Provided By Patient   Primary Caregiver Self   Support Systems Spouse/Significant Other;Children;Family Members;Temple/[place];Friends/Neighbors   PCP Verified by CM Yes  Laura Vick)   Prior Functional Level Independent in ADLs/IADLs   Current Functional Level Independent in ADLs/IADLs   Can patient return to prior living arrangement Yes   Ability to make needs known: Good   Family able to assist with home care needs: Yes   Would you like for me to discuss the discharge plan with any other family members/significant others, and if so, who? No   Financial Resources Medicare   Community Resources None   Social/Functional History   Lives With Spouse   Type of [facility] [address] [address] Help From [address] [facility]   Occupation Retired   Discharge Planning   Current Services Prior To Admission None   Potential Assistance Needed N/A   DME Ordered? No   Potential Assistance Purchasing Medications No   Type of Home Care Services None   Services At/After Discharge   Transition of Care Consult (CM Consult) Discharge [facility] Discharge None   [place] Resource Information Provided?  No   Mode of Transport at Discharge Other (see comment)  (Family)   Confirm Follow Up Transport Family

## 2023-11-29 NOTE — PROGRESS NOTES
TRANSFER - OUT REPORT:    Verbal report given to RN on Groverlindsay Lezama Sr.  being transferred to [facility]  for routine progression of patient care       Report consisted of patient's Situation, Background, Assessment and   Recommendations(SBAR). Information from the following report(s) Nurse Handoff Report and MAR was reviewed with the receiving nurse.       [facility] w/ Dr. Josiah Noel  No interventions  L snuff box access  TR band to L Snuff box @12 mL  No s/sxs of bleeding to L snuff box site    Heparin 5,000 units IC  Versed 4mg IV

## 2023-11-29 NOTE — PROGRESS NOTES
Radial compression band removed at 1615 after slowly reducing air to zero as per hospital protocol. No bleeding or hematoma noted. 2 x 2 gauze with tegaderm placed over puncture site. The affected extremity is warm and dry to the touch. Patient instructed to call if any bleeding noted on gauze. Patient verbalized understanding the nursing instructions.

## 2023-11-29 NOTE — PROGRESS NOTES
TRANSFER - OUT REPORT:    Verbal report given to [address] [place] [address] on Nahed Akhtar Sr.  being transferred to [facility] for routine progression of patient care       Report consisted of patient's Situation, Background, Assessment and   Recommendations(SBAR). Information from the following report(s) Nurse Handoff Report was reviewed with the receiving nurse. Lines:   Peripheral IV 11/28/23 Left; Anterior Forearm (Active)   Site Assessment Clean, dry & intact 11/29/23 0744   Line Status Flushed;Capped;Normal saline locked 11/29/23 0744   Line Care Connections checked and tightened;Cap changed 11/29/23 0744   Phlebitis Assessment No symptoms 11/29/23 0744   Infiltration Assessment 0 11/29/23 0744   Alcohol Cap Used Yes 11/29/23 0744   Dressing Status Clean, dry & intact 11/29/23 0744   Dressing Type Transparent 11/29/23 0744       Peripheral IV 11/29/23 Distal;Right; Anterior Antecubital (Active)   Site Assessment Clean, dry & intact 11/29/23 0744   Line Status Flushed;Capped;Normal saline locked 11/29/23 0744   Line Care Connections checked and tightened;Cap changed 11/29/23 0744   Phlebitis Assessment No symptoms 11/29/23 0744   Infiltration Assessment 0 11/29/23 0744   Alcohol Cap Used Yes 11/29/23 0744   Dressing Status Clean, dry & intact 11/29/23 0744   Dressing Type Transparent 11/29/23 0744        Opportunity for questions and clarification was provided.       Patient transported with:  teextee

## 2024-01-15 ENCOUNTER — TELEPHONE (OUTPATIENT)
Age: 84
End: 2024-01-15

## 2024-01-15 NOTE — TELEPHONE ENCOUNTER
Patient having Left Total Knee replacement on 02/29/24 with Dr. Frances. Requesting cardiac clearance and any medication hold. Fax: [phone]

## 2024-01-16 NOTE — TELEPHONE ENCOUNTER
The pt called back, I let him know Dr Arnold's response. Pt wants to have the surgery on scheduled date and was thinking he was cleared by Dr Arnold from his last office visit. Pt stated he is in excruciating pain form his knee and wants to have the surgery when it is scheduled.

## 2024-01-16 NOTE — TELEPHONE ENCOUNTER
I called the pt to let him know about postponing surgery until a year post stent. No VM available.

## 2024-01-17 NOTE — TELEPHONE ENCOUNTER
I faxed the [facility] clearance and med hold to Dr Frances. [phone] I tried calling the pt, the mailbox was full so I could not leave a VM.

## 2024-03-11 ENCOUNTER — HOSPITAL ENCOUNTER (OUTPATIENT)
Dept: MRI IMAGING | Age: 84
Setting detail: OBSERVATION
Discharge: HOME | End: 2024-03-14
Payer: MEDICARE

## 2024-03-11 ENCOUNTER — APPOINTMENT (OUTPATIENT)
Dept: CT IMAGING | Age: 84
End: 2024-03-11
Payer: MEDICARE

## 2024-03-11 ENCOUNTER — APPOINTMENT (OUTPATIENT)
Dept: ULTRASOUND IMAGING | Age: 84
End: 2024-03-11
Payer: MEDICARE

## 2024-03-11 ENCOUNTER — HOSPITAL ENCOUNTER (OUTPATIENT)
Age: 84
Setting detail: OBSERVATION
Discharge: HOME | End: 2024-03-12
Attending: EMERGENCY MEDICINE | Admitting: INTERNAL MEDICINE
Payer: MEDICARE

## 2024-03-11 ENCOUNTER — APPOINTMENT (OUTPATIENT)
Dept: GENERAL RADIOLOGY | Age: 84
End: 2024-03-11
Payer: MEDICARE

## 2024-03-11 ENCOUNTER — APPOINTMENT (OUTPATIENT)
Dept: NON INVASIVE DIAGNOSTICS | Age: 84
End: 2024-03-11
Payer: MEDICARE

## 2024-03-11 DIAGNOSIS — I21.4 NSTEMI (NON-ST ELEVATED MYOCARDIAL INFARCTION) (HCC): Primary | ICD-10-CM

## 2024-03-11 DIAGNOSIS — I63.9 ACUTE CVA (CEREBROVASCULAR ACCIDENT) (HCC): ICD-10-CM

## 2024-03-11 DIAGNOSIS — R07.9 CHEST PAIN: ICD-10-CM

## 2024-03-11 LAB
ANION GAP SERPL CALC-SCNC: 5 MMOL/L (ref 2–11)
APTT PPP: 28.1 SEC (ref 23.3–37.4)
BASOPHILS # BLD: 0 K/UL (ref 0–0.2)
BASOPHILS NFR BLD: 0 % (ref 0–2)
BUN SERPL-MCNC: 20 MG/DL (ref 8–23)
CALCIUM SERPL-MCNC: 8.6 MG/DL (ref 8.3–10.4)
CHLORIDE SERPL-SCNC: 109 MMOL/L (ref 103–113)
CO2 SERPL-SCNC: 26 MMOL/L (ref 21–32)
CREAT SERPL-MCNC: 0.9 MG/DL (ref 0.8–1.5)
DIFFERENTIAL METHOD BLD: ABNORMAL
ECHO AO ASC DIAM: 3.1 CM
ECHO AO ASCENDING AORTA INDEX: 1.5 CM/M2
ECHO AO ROOT DIAM: 3.2 CM
ECHO AO ROOT INDEX: 1.55 CM/M2
ECHO BSA: 2.09 M2
ECHO BSA: 2.09 M2
ECHO EST RA PRESSURE: 5 MMHG
ECHO LA DIAMETER INDEX: 2.43 CM/M2
ECHO LA DIAMETER: 5 CM
ECHO LA TO AORTIC ROOT RATIO: 1.56
ECHO LV E' LATERAL VELOCITY: 6 CM/S
ECHO LV E' SEPTAL VELOCITY: 4 CM/S
ECHO LV EDV A2C: 147 ML
ECHO LV EDV A4C: 145 ML
ECHO LV EDV BP: 147 ML (ref 67–155)
ECHO LV EDV INDEX A4C: 70 ML/M2
ECHO LV EDV INDEX BP: 71 ML/M2
ECHO LV EDV NDEX A2C: 71 ML/M2
ECHO LV EJECTION FRACTION A2C: 37 %
ECHO LV EJECTION FRACTION A4C: 37 %
ECHO LV EJECTION FRACTION BIPLANE: 38 % (ref 55–100)
ECHO LV ESV A2C: 92 ML
ECHO LV ESV A4C: 91 ML
ECHO LV ESV BP: 91 ML (ref 22–58)
ECHO LV ESV INDEX A2C: 45 ML/M2
ECHO LV ESV INDEX A4C: 44 ML/M2
ECHO LV ESV INDEX BP: 44 ML/M2
ECHO LV FRACTIONAL SHORTENING: 27 % (ref 28–44)
ECHO LV INTERNAL DIMENSION DIASTOLE INDEX: 2.91 CM/M2
ECHO LV INTERNAL DIMENSION DIASTOLIC: 6 CM (ref 4.2–5.9)
ECHO LV INTERNAL DIMENSION SYSTOLIC INDEX: 2.14 CM/M2
ECHO LV INTERNAL DIMENSION SYSTOLIC: 4.4 CM
ECHO LV IVSD: 1 CM (ref 0.6–1)
ECHO LV MASS 2D: 231.1 G (ref 88–224)
ECHO LV MASS INDEX 2D: 112.2 G/M2 (ref 49–115)
ECHO LV POSTERIOR WALL DIASTOLIC: 0.9 CM (ref 0.6–1)
ECHO LV RELATIVE WALL THICKNESS RATIO: 0.3
ECHO LVOT AREA: 3.1 CM2
ECHO LVOT DIAM: 2 CM
ECHO LVOT MEAN GRADIENT: 2 MMHG
ECHO LVOT PEAK GRADIENT: 3 MMHG
ECHO LVOT PEAK VELOCITY: 0.8 M/S
ECHO LVOT STROKE VOLUME INDEX: 26.5 ML/M2
ECHO LVOT SV: 54.6 ML
ECHO LVOT VTI: 17.4 CM
ECHO MV A VELOCITY: 0.98 M/S
ECHO MV AREA PHT: 3.1 CM2
ECHO MV E DECELERATION TIME (DT): 247.9 MS
ECHO MV E VELOCITY: 0.78 M/S
ECHO MV E/A RATIO: 0.8
ECHO MV E/E' LATERAL: 13
ECHO MV E/E' RATIO (AVERAGED): 16.25
ECHO MV PRESSURE HALF TIME (PHT): 71.9 MS
ECHO PV MAX VELOCITY: 1 M/S
ECHO PV PEAK GRADIENT: 4 MMHG
ECHO RIGHT VENTRICULAR SYSTOLIC PRESSURE (RVSP): 48 MMHG
ECHO RV BASAL DIMENSION: 3 CM
ECHO RV FREE WALL PEAK S': 8 CM/S
ECHO RV TAPSE: 1.3 CM (ref 1.7–?)
ECHO TV REGURGITANT MAX VELOCITY: 3.26 M/S
ECHO TV REGURGITANT PEAK GRADIENT: 42 MMHG
EKG ATRIAL RATE: 84 BPM
EKG DIAGNOSIS: NORMAL
EKG P AXIS: 36 DEGREES
EKG P-R INTERVAL: 129 MS
EKG Q-T INTERVAL: 381 MS
EKG QRS DURATION: 100 MS
EKG QTC CALCULATION (BAZETT): 451 MS
EKG R AXIS: -42 DEGREES
EKG T AXIS: 41 DEGREES
EKG VENTRICULAR RATE: 84 BPM
EOSINOPHIL # BLD: 0.1 K/UL (ref 0–0.8)
EOSINOPHIL NFR BLD: 1 % (ref 0.5–7.8)
ERYTHROCYTE [DISTWIDTH] IN BLOOD BY AUTOMATED COUNT: 14.9 % (ref 11.9–14.6)
ERYTHROCYTE [DISTWIDTH] IN BLOOD BY AUTOMATED COUNT: 15.2 % (ref 11.9–14.6)
GLUCOSE BLD STRIP.AUTO-MCNC: 107 MG/DL (ref 65–100)
GLUCOSE SERPL-MCNC: 106 MG/DL (ref 65–100)
HCT VFR BLD AUTO: 33.7 % (ref 41.1–50.3)
HCT VFR BLD AUTO: 34.4 % (ref 41.1–50.3)
HGB BLD-MCNC: 10.8 G/DL (ref 13.6–17.2)
HGB BLD-MCNC: 11.3 G/DL (ref 13.6–17.2)
IMM GRANULOCYTES # BLD AUTO: 0.1 K/UL (ref 0–0.5)
IMM GRANULOCYTES NFR BLD AUTO: 1 % (ref 0–5)
INR PPP: 1
LYMPHOCYTES # BLD: 1.6 K/UL (ref 0.5–4.6)
LYMPHOCYTES NFR BLD: 15 % (ref 13–44)
MCH RBC QN AUTO: 29.8 PG (ref 26.1–32.9)
MCH RBC QN AUTO: 30.5 PG (ref 26.1–32.9)
MCHC RBC AUTO-ENTMCNC: 32 G/DL (ref 31.4–35)
MCHC RBC AUTO-ENTMCNC: 32.8 G/DL (ref 31.4–35)
MCV RBC AUTO: 92.7 FL (ref 82–102)
MCV RBC AUTO: 93.1 FL (ref 82–102)
MONOCYTES # BLD: 0.7 K/UL (ref 0.1–1.3)
MONOCYTES NFR BLD: 7 % (ref 4–12)
NEUTS SEG # BLD: 7.5 K/UL (ref 1.7–8.2)
NEUTS SEG NFR BLD: 76 % (ref 43–78)
NRBC # BLD: 0 K/UL (ref 0–0.2)
NRBC # BLD: 0 K/UL (ref 0–0.2)
PLATELET # BLD AUTO: 234 K/UL (ref 150–450)
PLATELET # BLD AUTO: 252 K/UL (ref 150–450)
PMV BLD AUTO: 9.1 FL (ref 9.4–12.3)
PMV BLD AUTO: 9.2 FL (ref 9.4–12.3)
POTASSIUM SERPL-SCNC: 4.7 MMOL/L (ref 3.5–5.1)
PROTHROMBIN TIME: 13.2 SEC (ref 11.3–14.9)
RBC # BLD AUTO: 3.62 M/UL (ref 4.23–5.6)
RBC # BLD AUTO: 3.71 M/UL (ref 4.23–5.6)
SERVICE CMNT-IMP: ABNORMAL
SODIUM SERPL-SCNC: 140 MMOL/L (ref 136–146)
TROPONIN I SERPL HS-MCNC: 1559.4 PG/ML (ref 0–14)
TROPONIN I SERPL HS-MCNC: 843.3 PG/ML (ref 0–14)
UFH PPP CHRO-ACNC: <0.1 IU/ML (ref 0.3–0.7)
WBC # BLD AUTO: 10.1 K/UL (ref 4.3–11.1)
WBC # BLD AUTO: 9.3 K/UL (ref 4.3–11.1)

## 2024-03-11 PROCEDURE — 70498 CT ANGIOGRAPHY NECK: CPT

## 2024-03-11 PROCEDURE — 99285 EMERGENCY DEPT VISIT HI MDM: CPT

## 2024-03-11 PROCEDURE — 6370000000 HC RX 637 (ALT 250 FOR IP): Performed by: INTERNAL MEDICINE

## 2024-03-11 PROCEDURE — C1894 INTRO/SHEATH, NON-LASER: HCPCS | Performed by: INTERNAL MEDICINE

## 2024-03-11 PROCEDURE — 6360000002 HC RX W HCPCS: Performed by: INTERNAL MEDICINE

## 2024-03-11 PROCEDURE — 93459 L HRT ART/GRFT ANGIO: CPT | Performed by: INTERNAL MEDICINE

## 2024-03-11 PROCEDURE — 99291 CRITICAL CARE FIRST HOUR: CPT | Performed by: PSYCHIATRY & NEUROLOGY

## 2024-03-11 PROCEDURE — 2500000003 HC RX 250 WO HCPCS: Performed by: INTERNAL MEDICINE

## 2024-03-11 PROCEDURE — 6360000004 HC RX CONTRAST MEDICATION: Performed by: INTERNAL MEDICINE

## 2024-03-11 PROCEDURE — G0378 HOSPITAL OBSERVATION PER HR: HCPCS

## 2024-03-11 PROCEDURE — 85730 THROMBOPLASTIN TIME PARTIAL: CPT

## 2024-03-11 PROCEDURE — 70450 CT HEAD/BRAIN W/O DYE: CPT

## 2024-03-11 PROCEDURE — C1769 GUIDE WIRE: HCPCS | Performed by: INTERNAL MEDICINE

## 2024-03-11 PROCEDURE — 85520 HEPARIN ASSAY: CPT

## 2024-03-11 PROCEDURE — 99153 MOD SED SAME PHYS/QHP EA: CPT | Performed by: INTERNAL MEDICINE

## 2024-03-11 PROCEDURE — 71046 X-RAY EXAM CHEST 2 VIEWS: CPT

## 2024-03-11 PROCEDURE — 85025 COMPLETE CBC W/AUTO DIFF WBC: CPT

## 2024-03-11 PROCEDURE — 70551 MRI BRAIN STEM W/O DYE: CPT

## 2024-03-11 PROCEDURE — 80048 BASIC METABOLIC PNL TOTAL CA: CPT

## 2024-03-11 PROCEDURE — 2580000003 HC RX 258: Performed by: PHYSICIAN ASSISTANT

## 2024-03-11 PROCEDURE — 93970 EXTREMITY STUDY: CPT

## 2024-03-11 PROCEDURE — 99203 OFFICE O/P NEW LOW 30 MIN: CPT | Performed by: INTERNAL MEDICINE

## 2024-03-11 PROCEDURE — 99152 MOD SED SAME PHYS/QHP 5/>YRS: CPT | Performed by: INTERNAL MEDICINE

## 2024-03-11 PROCEDURE — 93010 ELECTROCARDIOGRAM REPORT: CPT | Performed by: INTERNAL MEDICINE

## 2024-03-11 PROCEDURE — 85610 PROTHROMBIN TIME: CPT

## 2024-03-11 PROCEDURE — 36415 COLL VENOUS BLD VENIPUNCTURE: CPT

## 2024-03-11 PROCEDURE — 2580000003 HC RX 258: Performed by: INTERNAL MEDICINE

## 2024-03-11 PROCEDURE — 93005 ELECTROCARDIOGRAM TRACING: CPT | Performed by: EMERGENCY MEDICINE

## 2024-03-11 PROCEDURE — 84484 ASSAY OF TROPONIN QUANT: CPT

## 2024-03-11 PROCEDURE — 85027 COMPLETE CBC AUTOMATED: CPT

## 2024-03-11 PROCEDURE — 82962 GLUCOSE BLOOD TEST: CPT

## 2024-03-11 PROCEDURE — 6370000000 HC RX 637 (ALT 250 FOR IP): Performed by: PHYSICIAN ASSISTANT

## 2024-03-11 PROCEDURE — 2709999900 HC NON-CHARGEABLE SUPPLY: Performed by: INTERNAL MEDICINE

## 2024-03-11 PROCEDURE — 6370000000 HC RX 637 (ALT 250 FOR IP)

## 2024-03-11 PROCEDURE — C8929 TTE W OR WO FOL WCON,DOPPLER: HCPCS

## 2024-03-11 RX ORDER — ACETAMINOPHEN 325 MG/1
650 TABLET ORAL EVERY 6 HOURS PRN
Status: DISCONTINUED | OUTPATIENT
Start: 2024-03-11 | End: 2024-03-11 | Stop reason: SDUPTHER

## 2024-03-11 RX ORDER — SODIUM CHLORIDE 9 MG/ML
INJECTION, SOLUTION INTRAVENOUS PRN
Status: DISCONTINUED | OUTPATIENT
Start: 2024-03-11 | End: 2024-03-12 | Stop reason: HOSPADM

## 2024-03-11 RX ORDER — ESCITALOPRAM OXALATE 10 MG/1
20 TABLET ORAL DAILY
Status: DISCONTINUED | OUTPATIENT
Start: 2024-03-11 | End: 2024-03-12 | Stop reason: HOSPADM

## 2024-03-11 RX ORDER — NITROGLYCERIN 0.4 MG/1
0.4 TABLET SUBLINGUAL EVERY 5 MIN PRN
Status: DISCONTINUED | OUTPATIENT
Start: 2024-03-11 | End: 2024-03-12 | Stop reason: HOSPADM

## 2024-03-11 RX ORDER — TRAMADOL HYDROCHLORIDE 50 MG/1
50 TABLET ORAL EVERY 6 HOURS PRN
COMMUNITY

## 2024-03-11 RX ORDER — ASPIRIN 81 MG/1
81 TABLET ORAL DAILY
Status: DISCONTINUED | OUTPATIENT
Start: 2024-03-11 | End: 2024-03-12 | Stop reason: HOSPADM

## 2024-03-11 RX ORDER — MIDAZOLAM HYDROCHLORIDE 1 MG/ML
INJECTION INTRAMUSCULAR; INTRAVENOUS PRN
Status: DISCONTINUED | OUTPATIENT
Start: 2024-03-11 | End: 2024-03-11 | Stop reason: HOSPADM

## 2024-03-11 RX ORDER — LIDOCAINE HYDROCHLORIDE 10 MG/ML
INJECTION, SOLUTION INFILTRATION; PERINEURAL PRN
Status: DISCONTINUED | OUTPATIENT
Start: 2024-03-11 | End: 2024-03-11 | Stop reason: HOSPADM

## 2024-03-11 RX ORDER — SODIUM CHLORIDE 0.9 % (FLUSH) 0.9 %
5-40 SYRINGE (ML) INJECTION EVERY 12 HOURS SCHEDULED
Status: DISCONTINUED | OUTPATIENT
Start: 2024-03-11 | End: 2024-03-12 | Stop reason: HOSPADM

## 2024-03-11 RX ORDER — ISOSORBIDE MONONITRATE 30 MG/1
30 TABLET, EXTENDED RELEASE ORAL DAILY
Status: DISCONTINUED | OUTPATIENT
Start: 2024-03-11 | End: 2024-03-12 | Stop reason: HOSPADM

## 2024-03-11 RX ORDER — SODIUM CHLORIDE 0.9 % (FLUSH) 0.9 %
5-40 SYRINGE (ML) INJECTION PRN
Status: DISCONTINUED | OUTPATIENT
Start: 2024-03-11 | End: 2024-03-12 | Stop reason: HOSPADM

## 2024-03-11 RX ORDER — HEPARIN SODIUM 1000 [USP'U]/ML
40 INJECTION, SOLUTION INTRAVENOUS; SUBCUTANEOUS PRN
Status: DISCONTINUED | OUTPATIENT
Start: 2024-03-11 | End: 2024-03-12 | Stop reason: ALTCHOICE

## 2024-03-11 RX ORDER — POLYETHYLENE GLYCOL 3350 17 G/17G
17 POWDER, FOR SOLUTION ORAL DAILY PRN
Status: DISCONTINUED | OUTPATIENT
Start: 2024-03-11 | End: 2024-03-12 | Stop reason: HOSPADM

## 2024-03-11 RX ORDER — HEPARIN SODIUM 1000 [USP'U]/ML
40 INJECTION, SOLUTION INTRAVENOUS; SUBCUTANEOUS PRN
Status: DISCONTINUED | OUTPATIENT
Start: 2024-03-11 | End: 2024-03-11

## 2024-03-11 RX ORDER — HEPARIN SODIUM 10000 [USP'U]/100ML
5-30 INJECTION, SOLUTION INTRAVENOUS CONTINUOUS
Status: DISCONTINUED | OUTPATIENT
Start: 2024-03-11 | End: 2024-03-11

## 2024-03-11 RX ORDER — HEPARIN SODIUM 1000 [USP'U]/ML
30 INJECTION, SOLUTION INTRAVENOUS; SUBCUTANEOUS ONCE
Status: DISCONTINUED | OUTPATIENT
Start: 2024-03-11 | End: 2024-03-11

## 2024-03-11 RX ORDER — SODIUM CHLORIDE 9 MG/ML
INJECTION, SOLUTION INTRAVENOUS CONTINUOUS
Status: DISCONTINUED | OUTPATIENT
Start: 2024-03-11 | End: 2024-03-11

## 2024-03-11 RX ORDER — ACETAMINOPHEN 325 MG/1
650 TABLET ORAL EVERY 4 HOURS PRN
Status: DISCONTINUED | OUTPATIENT
Start: 2024-03-11 | End: 2024-03-12 | Stop reason: HOSPADM

## 2024-03-11 RX ORDER — OXYCODONE HYDROCHLORIDE 5 MG/1
5 TABLET ORAL EVERY 4 HOURS PRN
Status: DISCONTINUED | OUTPATIENT
Start: 2024-03-11 | End: 2024-03-12 | Stop reason: HOSPADM

## 2024-03-11 RX ORDER — POTASSIUM CHLORIDE 7.45 MG/ML
10 INJECTION INTRAVENOUS PRN
Status: DISCONTINUED | OUTPATIENT
Start: 2024-03-11 | End: 2024-03-12 | Stop reason: HOSPADM

## 2024-03-11 RX ORDER — CARVEDILOL 12.5 MG/1
12.5 TABLET ORAL 2 TIMES DAILY WITH MEALS
Status: DISCONTINUED | OUTPATIENT
Start: 2024-03-11 | End: 2024-03-12 | Stop reason: HOSPADM

## 2024-03-11 RX ORDER — HEPARIN SODIUM 1000 [USP'U]/ML
4000 INJECTION, SOLUTION INTRAVENOUS; SUBCUTANEOUS ONCE
Status: DISCONTINUED | OUTPATIENT
Start: 2024-03-11 | End: 2024-03-12 | Stop reason: HOSPADM

## 2024-03-11 RX ORDER — TRAMADOL HYDROCHLORIDE 50 MG/1
50 TABLET ORAL EVERY 6 HOURS PRN
Status: DISCONTINUED | OUTPATIENT
Start: 2024-03-11 | End: 2024-03-12 | Stop reason: HOSPADM

## 2024-03-11 RX ORDER — POTASSIUM CHLORIDE 20 MEQ/1
40 TABLET, EXTENDED RELEASE ORAL PRN
Status: DISCONTINUED | OUTPATIENT
Start: 2024-03-11 | End: 2024-03-12 | Stop reason: HOSPADM

## 2024-03-11 RX ORDER — LISINOPRIL 20 MG/1
40 TABLET ORAL DAILY
Status: DISCONTINUED | OUTPATIENT
Start: 2024-03-12 | End: 2024-03-12 | Stop reason: HOSPADM

## 2024-03-11 RX ORDER — HYDROMORPHONE HYDROCHLORIDE 2 MG/ML
INJECTION, SOLUTION INTRAMUSCULAR; INTRAVENOUS; SUBCUTANEOUS PRN
Status: DISCONTINUED | OUTPATIENT
Start: 2024-03-11 | End: 2024-03-11 | Stop reason: HOSPADM

## 2024-03-11 RX ORDER — HEPARIN SODIUM 1000 [USP'U]/ML
80 INJECTION, SOLUTION INTRAVENOUS; SUBCUTANEOUS PRN
Status: DISCONTINUED | OUTPATIENT
Start: 2024-03-11 | End: 2024-03-12 | Stop reason: ALTCHOICE

## 2024-03-11 RX ORDER — HEPARIN SODIUM 1000 [USP'U]/ML
2000 INJECTION, SOLUTION INTRAVENOUS; SUBCUTANEOUS PRN
Status: DISCONTINUED | OUTPATIENT
Start: 2024-03-11 | End: 2024-03-11

## 2024-03-11 RX ORDER — METOPROLOL TARTRATE 1 MG/ML
INJECTION, SOLUTION INTRAVENOUS PRN
Status: DISCONTINUED | OUTPATIENT
Start: 2024-03-11 | End: 2024-03-11 | Stop reason: HOSPADM

## 2024-03-11 RX ORDER — ATORVASTATIN CALCIUM 80 MG/1
80 TABLET, FILM COATED ORAL DAILY
Status: DISCONTINUED | OUTPATIENT
Start: 2024-03-11 | End: 2024-03-12 | Stop reason: HOSPADM

## 2024-03-11 RX ORDER — HEPARIN SODIUM 200 [USP'U]/100ML
INJECTION, SOLUTION INTRAVENOUS CONTINUOUS PRN
Status: COMPLETED | OUTPATIENT
Start: 2024-03-11 | End: 2024-03-11

## 2024-03-11 RX ORDER — HEPARIN SODIUM 1000 [USP'U]/ML
4000 INJECTION, SOLUTION INTRAVENOUS; SUBCUTANEOUS PRN
Status: DISCONTINUED | OUTPATIENT
Start: 2024-03-11 | End: 2024-03-11

## 2024-03-11 RX ORDER — MAGNESIUM SULFATE IN WATER 40 MG/ML
2000 INJECTION, SOLUTION INTRAVENOUS PRN
Status: DISCONTINUED | OUTPATIENT
Start: 2024-03-11 | End: 2024-03-12 | Stop reason: HOSPADM

## 2024-03-11 RX ORDER — OXYCODONE HYDROCHLORIDE 5 MG/1
5 TABLET ORAL EVERY 4 HOURS PRN
COMMUNITY

## 2024-03-11 RX ORDER — HEPARIN SODIUM 10000 [USP'U]/100ML
5-30 INJECTION, SOLUTION INTRAVENOUS CONTINUOUS
Status: DISCONTINUED | OUTPATIENT
Start: 2024-03-11 | End: 2024-03-12

## 2024-03-11 RX ADMIN — ISOSORBIDE MONONITRATE 30 MG: 30 TABLET, EXTENDED RELEASE ORAL at 18:21

## 2024-03-11 RX ADMIN — ESCITALOPRAM OXALATE 20 MG: 10 TABLET ORAL at 18:19

## 2024-03-11 RX ADMIN — ATORVASTATIN CALCIUM 80 MG: 80 TABLET, FILM COATED ORAL at 18:19

## 2024-03-11 RX ADMIN — CARVEDILOL 12.5 MG: 12.5 TABLET, FILM COATED ORAL at 18:19

## 2024-03-11 RX ADMIN — IOPAMIDOL 50 ML: 755 INJECTION, SOLUTION INTRAVENOUS at 17:03

## 2024-03-11 RX ADMIN — HEPARIN SODIUM 18 UNITS/KG/HR: 10000 INJECTION, SOLUTION INTRAVENOUS at 18:30

## 2024-03-11 RX ADMIN — ASPIRIN 81 MG: 81 TABLET, COATED ORAL at 18:19

## 2024-03-11 RX ADMIN — SODIUM CHLORIDE, PRESERVATIVE FREE 0.3 ML: 5 INJECTION INTRAVENOUS at 15:25

## 2024-03-11 RX ADMIN — SODIUM CHLORIDE, PRESERVATIVE FREE 10 ML: 5 INJECTION INTRAVENOUS at 20:58

## 2024-03-11 RX ADMIN — OXYCODONE 5 MG: 5 TABLET ORAL at 20:57

## 2024-03-11 ASSESSMENT — PAIN - FUNCTIONAL ASSESSMENT: PAIN_FUNCTIONAL_ASSESSMENT: NONE - DENIES PAIN

## 2024-03-11 ASSESSMENT — LIFESTYLE VARIABLES
HOW OFTEN DO YOU HAVE A DRINK CONTAINING ALCOHOL: NEVER
HOW MANY STANDARD DRINKS CONTAINING ALCOHOL DO YOU HAVE ON A TYPICAL DAY: PATIENT DOES NOT DRINK

## 2024-03-11 ASSESSMENT — PAIN DESCRIPTION - LOCATION: LOCATION: LEG

## 2024-03-11 ASSESSMENT — PAIN DESCRIPTION - ORIENTATION: ORIENTATION: LEFT

## 2024-03-11 ASSESSMENT — PAIN SCALES - GENERAL
PAINLEVEL_OUTOF10: 0
PAINLEVEL_OUTOF10: 7

## 2024-03-11 NOTE — PROGRESS NOTES
[facility] RtFm with Dr Cain.  No intervention.    Versed 4mg  Fentanyl 75mcg  Dilaudid 0.5mg  RtFm closed with manual pressure    Report to receiving RN.  Pt transferred to CPRU.

## 2024-03-11 NOTE — ED TRIAGE NOTES
Pt arrives via EMS from home.  Pt was doing rehab for knee replacement.  Pt reports chest pain that began in his left side of chest to left side of jaw.  Pt took 324 mg aspirin and 3 nitro which relieved pain.  Pt has hx of CABG.    /34, , HR 90  20 G in L wrist

## 2024-03-11 NOTE — DISCHARGE INSTRUCTIONS
DISPOSITION: The patient is being discharged home in stable condition on a low saturated fat, low cholesterol and low salt diet. The patient is instructed to advance activities as tolerated to the limit of fatigue or shortness of breath. The patient is instructed to avoid all heavy lifting, straining, stooping or squatting for 3-5 days. The patient is instructed to watch the cath site for bleeding/oozing; if seen, the patient is instructed to apply firm pressure with a clean cloth and call [facility] Cardiology at [phone]. The patient is instructed to watch for signs of infection which include: increasing area of redness, fever/hot to touch or purulent drainage at the catheterization site. The patient is instructed not to soak in a bathtub for 7-10 days, but is cleared to shower. The patient is instructed to call the office or return to the ER for immediate evaluation for any shortness of breath or chest pain not relieved by NTG.        isosorbide mononitrate  Pronunciation:  EYE ian SOR bide MON oh ISAURO trate  Brand:  Imdur, Monoket  What is the most important information I should know about isosorbide mononitrate?  You should not take erectile dysfunction medicine (Viagra, Cialis, Levitra, Stendra, Staxyn, sildenafil, avanafil, tadalafil, vardenafil) while you are taking isosorbide mononitrate. Taking these medicine together can cause a sudden and serious decrease in blood pressure.  Seek emergency medical attention if you have early symptoms of a heart attack (chest pain or pressure, pain spreading to your jaw or shoulder, sweating, general ill feeling).  What is isosorbide mononitrate?  Isosorbide mononitrate is a nitrate that dilates (widens) blood vessels, making it easier for blood to flow through them and easier for the heart to pump.  Isosorbide mononitrate is used to prevent angina attacks (chest pain).  Isosorbide mononitrate will not treat an angina attack that has already begun.   Isosorbide mononitrate 
room air

## 2024-03-11 NOTE — PROGRESS NOTES
TRANSFER - IN REPORT:    Verbal report received from KARMEN Joyce on Jesus Mcgovern Sr. being received from cath lab for routine progression of patient care      Report consisted of patient’s Situation, Background, Assessment and Recommendations(SBAR).     Information from the following report(s) SBAR, Procedure Summary, Intake/Output, and MAR was reviewed with the receiving nurse.    Opportunity for questions and clarification was provided.      Assessment completed upon patient’s arrival to unit and care assumed.

## 2024-03-11 NOTE — PROGRESS NOTES
4 Eyes Skin Assessment     NAME:  Jesus Mcgovern Sr.  YOB: 1940  MEDICAL RECORD NUMBER:  [id number]    The patient is being assessed for  Admission    I agree that at least one RN has performed a thorough Head to Toe Skin Assessment on the patient. ALL assessment sites listed below have been assessed.      Areas assessed by both nurses:    Arms, Elbows, Hands, Sacrum. Buttock, Coccyx, Ischium, and Legs. Feet and Heels   Patient has a incision on the left knee from a knee replacement surgery in February. Patient has bruises on the left side of his back down his left leg.      Does the Patient have a Wound? No noted wound(s)       Jaime Prevention initiated by RN: No  Wound Care Orders initiated by RN: No    Pressure Injury (Stage 3,4, Unstageable, DTI, NWPT, and Complex wounds) if present, place Wound referral order by RN under : No    New Ostomies, if present place, Ostomy referral order under : No     Nurse 1 eSignature: Electronically signed by Dasha Grimm RN on 3/11/24 at 5:44 PM EDT    **SHARE this note so that the co-signing nurse can place an eSignature**    Nurse 2 eSignature: Electronically signed by Suzanne Adkins RN on 3/11/24 at 6:40 PM EDT

## 2024-03-11 NOTE — PROGRESS NOTES
Dual provider site check performed with Kacie PERAZA.      right groin site noted with:  Bleeding:  No   Hematoma:   No     Other comments:

## 2024-03-11 NOTE — ED PROVIDER NOTES
Emergency Department Provider Note       PCP: Nayeli Diego MD   Age: 84 y.o.   Sex: male     DISPOSITION Decision To Admit 03/11/2024 11:25:08 AM       ICD-10-CM    1. NSTEMI (non-ST elevated myocardial infarction) (HCC)  I21.4           Medical Decision Making     Patient has already had more than enough aspirin.  His blood pressure is normal.  His EKG shows no STEMI.  He is pain-free.    Patient's troponin is 850.  ? Due to no plavix as cath in Nov showed patent RCA stent.  D/w Dr. Cain.       1 or more acute illnesses that pose a threat to life or bodily function.   Prescription drug management performed.  Discussion with external consultants.    I independently ordered and reviewed each unique test.  I reviewed external records: provider visit note from outside specialist. Cardiology, ekg  The patients assessment required an independent historian: wife.  The reason they were needed is important historical information not provided by the patient.  I interpreted the X-rays no acute infiltrate.  My Independent EKG Interpretation: sinus rhythm, no evidence of arrhythmia and no acute changes      ST Segments:Nonspecific ST segments - NO STEMI   Rate: 84, occasional PAC, T waves diffusely flat.    The patient was admitted and I have discussed patient management with the admitting provider.AMANDA Cain          History     Presents with complaint of chest pain.  Patient states, \"I have had a heart attack.\"  Had left jaw pain while doing exercises after left total knee replacement.  Patient had knee replacement on 2/29/2024.  He reports while doing the exercises he developed left jaw pain that radiated across his face.  He had no shortness of breath nausea diaphoresis or chest pain.  He took 2 full dose aspirin and then continued third 1.  He took a total of 3 nitroglycerin.  After about 45 minutes his pain had resolved.  He has no pain now.  He has a history of cardiac disease and multiple interventions.  Wife

## 2024-03-11 NOTE — PROGRESS NOTES
Pt speech garbled.  Pt wife concerned that not improving.    No other deficits noted  Dr. Cain notified, code stroke called

## 2024-03-11 NOTE — ACP (ADVANCE CARE PLANNING)
Advance Care Planning   Healthcare Decision Maker:    Primary Decision Maker: MACARIO HENAO - [facility] [place] - [phone]    Click here to complete Healthcare Decision Makers including selection of the Healthcare Decision Maker Relationship (ie \"Primary\").  Today we documented Decision Maker(s) consistent with Legal Next of Kin hierarchy.

## 2024-03-11 NOTE — H&P
([id number]) on 3/11/2024 11:05:00 AM     Basic Metabolic Panel    Collection Time: 03/11/24 10:17 AM   Result Value Ref Range    Sodium 140 136 - 146 mmol/L    Potassium 4.7 3.5 - 5.1 mmol/L    Chloride 109 103 - 113 mmol/L    CO2 26 21 - 32 mmol/L    Anion Gap 5 2 - 11 mmol/L    Glucose 106 (H) 65 - 100 mg/dL    BUN 20 8 - 23 MG/DL    Creatinine 0.90 0.8 - 1.5 MG/DL    Est, Glom Filt Rate >60 >60 ml/min/1.73m2    Calcium 8.6 8.3 - 10.4 MG/DL   Troponin    Collection Time: 03/11/24 10:17 AM   Result Value Ref Range    Troponin, High Sensitivity 843.3 (HH) 0 - 14 pg/mL   CBC with Auto Differential    Collection Time: 03/11/24 11:13 AM   Result Value Ref Range    WBC 10.1 4.3 - 11.1 K/uL    RBC 3.62 (L) 4.23 - 5.6 M/uL    Hemoglobin 10.8 (L) 13.6 - 17.2 g/dL    Hematocrit 33.7 (L) 41.1 - 50.3 %    MCV 93.1 82 - 102 FL    MCH 29.8 26.1 - 32.9 PG    MCHC 32.0 31.4 - 35.0 g/dL    RDW 14.9 (H) 11.9 - 14.6 %    Platelets 234 150 - 450 K/uL    MPV 9.2 (L) 9.4 - 12.3 FL    nRBC 0.00 0.0 - 0.2 K/uL    Differential Type AUTOMATED      Neutrophils % 76 43 - 78 %    Lymphocytes % 15 13 - 44 %    Monocytes % 7 4.0 - 12.0 %    Eosinophils % 1 0.5 - 7.8 %    Basophils % 0 0.0 - 2.0 %    Immature Granulocytes 1 0.0 - 5.0 %    Neutrophils Absolute 7.5 1.7 - 8.2 K/UL    Lymphocytes Absolute 1.6 0.5 - 4.6 K/UL    Monocytes Absolute 0.7 0.1 - 1.3 K/UL    Eosinophils Absolute 0.1 0.0 - 0.8 K/UL    Basophils Absolute 0.0 0.0 - 0.2 K/UL    Absolute Immature Granulocyte 0.1 0.0 - 0.5 K/UL       Patient has been seen and examined by Dr. Cain and they agree with the following assessment and plan:     Assessment/Plan:         Chest pain   S/P CABG x 2  - Pt w/ L sided dull midsternal Cp then L sided jaw pain, resolved w/ nitro x 3 and hx of CAD s/p PCI 5/2023 off of DAPT x 3 weeks  - Hstrop 843. EKG in NSR w/ PACs. s/p ASA 324mg at home  - HEART score 7 given trop, HTN, HLD, age, hx  - Continue ASA, PRN Nitro, ordered IV Heparin, holding plavix

## 2024-03-11 NOTE — PROGRESS NOTES
Report received from  Cath Lab RN. Procedural finding communicated. Intra procedural medication administration reviewed. Progression of care discussed.    Patient received into CPRU room 10, Post C    Access site without bleeding or swelling. Right groin    Patient instructed to limit movement of right lower extremity.    Routine post procedural vital signs & site assessment initiated.

## 2024-03-11 NOTE — ICUWATCH
RAPID RESPONSE CRITICAL CARE OUTREACH NURSE NOTE      Pt was seen and examined by this Outreach RN following Rapid Response.  Pt status/focused assessment upon arrival to Rapid Response as follows:      SITUATION:  Code S    LATEST VITAL SIGNS AND LAB VALUES RECORDED:     Vitals:    03/11/24 1600 03/11/24 1630 03/11/24 1635 03/11/24 1645   BP: (!) 154/90 (!) 140/75 133/87 137/83   Pulse: 88 90 88 88   Resp: 12 18 17 22   Temp:       TempSrc:       SpO2:       Weight:       Height:         Recent Labs     03/11/24  1017      K 4.7      CO2 26   BUN 20        Recent Labs     03/11/24  1113   WBC 10.1   HGB 10.8*   HCT 33.7*             LAB WORK PENDING/SENT DURING RAPID RESPONSE:  none    Primary RN at bedside:  yes  RT at bedside:  n/a  MD at bedside:  yes      Interventions completed during Rapid Response:  Pt noted to have dysarthria following heart cath.  Speech not improved after several hours post cath. Code S called, CT/ CTA ordered. Pt able to answer questions and follow commands, NIH 4 on initial assessment.      Is pt transferring to Critical Care bed?  no  Did Outreach RN assist with transfer?  N/a      Pt disposition IF not transferring to Critical Care:  Admit to Tele floor      Post Rapid Response plan of care:  Will follow per Outreach protocol.

## 2024-03-11 NOTE — PROGRESS NOTES
TRANSFER - OUT REPORT:    Verbal report given to Dasha RN on Jesus Mcgovern Sr.  being transferred to [facility] for routine progression of patient care       Report consisted of patient's Situation, Background, Assessment and   Recommendations(SBAR).     Information from the following report(s) Nurse Handoff Report was reviewed with the receiving nurse.    Ginger Fall Assessment:    Presents to emergency department  because of falls (Syncope, seizure, or loss of consciousness): No  Age > 70: Yes  Altered Mental Status, Intoxication with alcohol or substance confusion (Disorientation, impaired judgment, poor safety awaremess, or inability to follow instructions): No  Impaired Mobility: Ambulates or transfers with assistive devices or assistance; Unable to ambulate or transer.: Yes (pt uses walker to ambulate since knee replacement)  Nursing Judgement: Yes          Lines:   Peripheral IV 03/11/24 Left;Anterior Wrist (Active)        Opportunity for questions and clarification was provided.      Patient transported with:  Tech

## 2024-03-12 VITALS
TEMPERATURE: 98.1 F | HEIGHT: 70 IN | RESPIRATION RATE: 18 BRPM | DIASTOLIC BLOOD PRESSURE: 78 MMHG | SYSTOLIC BLOOD PRESSURE: 130 MMHG | WEIGHT: 202.16 LBS | BODY MASS INDEX: 28.94 KG/M2 | HEART RATE: 88 BPM | OXYGEN SATURATION: 97 %

## 2024-03-12 PROBLEM — R07.9 CHEST PAIN: Status: RESOLVED | Noted: 2023-11-28 | Resolved: 2024-03-12

## 2024-03-12 PROBLEM — R47.1 DYSARTHRIA: Status: ACTIVE | Noted: 2024-03-12

## 2024-03-12 LAB
ANION GAP SERPL CALC-SCNC: 6 MMOL/L (ref 2–11)
BUN SERPL-MCNC: 18 MG/DL (ref 8–23)
CALCIUM SERPL-MCNC: 8.4 MG/DL (ref 8.3–10.4)
CHLORIDE SERPL-SCNC: 109 MMOL/L (ref 103–113)
CHOLEST SERPL-MCNC: 137 MG/DL
CO2 SERPL-SCNC: 25 MMOL/L (ref 21–32)
CREAT SERPL-MCNC: 1 MG/DL (ref 0.8–1.5)
ERYTHROCYTE [DISTWIDTH] IN BLOOD BY AUTOMATED COUNT: 15.1 % (ref 11.9–14.6)
EST. AVERAGE GLUCOSE BLD GHB EST-MCNC: 100 MG/DL
GLUCOSE SERPL-MCNC: 123 MG/DL (ref 65–100)
HBA1C MFR BLD: 5.1 % (ref 4.8–5.6)
HCT VFR BLD AUTO: 30.4 % (ref 41.1–50.3)
HDLC SERPL-MCNC: 29 MG/DL (ref 40–60)
HDLC SERPL: 4.7
HGB BLD-MCNC: 9.8 G/DL (ref 13.6–17.2)
LDLC SERPL CALC-MCNC: 84.4 MG/DL
MAGNESIUM SERPL-MCNC: 2 MG/DL (ref 1.8–2.4)
MCH RBC QN AUTO: 29.7 PG (ref 26.1–32.9)
MCHC RBC AUTO-ENTMCNC: 32.2 G/DL (ref 31.4–35)
MCV RBC AUTO: 92.1 FL (ref 82–102)
NRBC # BLD: 0 K/UL (ref 0–0.2)
PLATELET # BLD AUTO: 232 K/UL (ref 150–450)
PMV BLD AUTO: 8.9 FL (ref 9.4–12.3)
POTASSIUM SERPL-SCNC: 4 MMOL/L (ref 3.5–5.1)
RBC # BLD AUTO: 3.3 M/UL (ref 4.23–5.6)
SODIUM SERPL-SCNC: 140 MMOL/L (ref 136–146)
TRIGL SERPL-MCNC: 118 MG/DL (ref 35–150)
UFH PPP CHRO-ACNC: 0.29 IU/ML (ref 0.3–0.7)
UFH PPP CHRO-ACNC: 0.95 IU/ML (ref 0.3–0.7)
VLDLC SERPL CALC-MCNC: 23.6 MG/DL (ref 6–23)
WBC # BLD AUTO: 9.7 K/UL (ref 4.3–11.1)

## 2024-03-12 PROCEDURE — 96365 THER/PROPH/DIAG IV INF INIT: CPT

## 2024-03-12 PROCEDURE — 85027 COMPLETE CBC AUTOMATED: CPT

## 2024-03-12 PROCEDURE — 2580000003 HC RX 258: Performed by: INTERNAL MEDICINE

## 2024-03-12 PROCEDURE — 6370000000 HC RX 637 (ALT 250 FOR IP): Performed by: PHYSICIAN ASSISTANT

## 2024-03-12 PROCEDURE — 96366 THER/PROPH/DIAG IV INF ADDON: CPT

## 2024-03-12 PROCEDURE — 83735 ASSAY OF MAGNESIUM: CPT

## 2024-03-12 PROCEDURE — 80048 BASIC METABOLIC PNL TOTAL CA: CPT

## 2024-03-12 PROCEDURE — 83036 HEMOGLOBIN GLYCOSYLATED A1C: CPT

## 2024-03-12 PROCEDURE — 6370000000 HC RX 637 (ALT 250 FOR IP)

## 2024-03-12 PROCEDURE — 36415 COLL VENOUS BLD VENIPUNCTURE: CPT

## 2024-03-12 PROCEDURE — G0378 HOSPITAL OBSERVATION PER HR: HCPCS

## 2024-03-12 PROCEDURE — 85520 HEPARIN ASSAY: CPT

## 2024-03-12 PROCEDURE — 6370000000 HC RX 637 (ALT 250 FOR IP): Performed by: INTERNAL MEDICINE

## 2024-03-12 PROCEDURE — 6360000002 HC RX W HCPCS: Performed by: INTERNAL MEDICINE

## 2024-03-12 PROCEDURE — 99232 SBSQ HOSP IP/OBS MODERATE 35: CPT | Performed by: NURSE PRACTITIONER

## 2024-03-12 PROCEDURE — 80061 LIPID PANEL: CPT

## 2024-03-12 PROCEDURE — 99213 OFFICE O/P EST LOW 20 MIN: CPT | Performed by: INTERNAL MEDICINE

## 2024-03-12 PROCEDURE — 2580000003 HC RX 258: Performed by: PHYSICIAN ASSISTANT

## 2024-03-12 RX ORDER — ISOSORBIDE MONONITRATE 30 MG/1
30 TABLET, EXTENDED RELEASE ORAL DAILY
Qty: 30 TABLET | Refills: 3 | Status: SHIPPED | OUTPATIENT
Start: 2024-03-13

## 2024-03-12 RX ORDER — CLOPIDOGREL BISULFATE 75 MG/1
75 TABLET ORAL DAILY
Status: DISCONTINUED | OUTPATIENT
Start: 2024-03-12 | End: 2024-03-12 | Stop reason: HOSPADM

## 2024-03-12 RX ADMIN — ASPIRIN 81 MG: 81 TABLET, COATED ORAL at 08:30

## 2024-03-12 RX ADMIN — OXYCODONE 5 MG: 5 TABLET ORAL at 01:51

## 2024-03-12 RX ADMIN — ISOSORBIDE MONONITRATE 30 MG: 30 TABLET, EXTENDED RELEASE ORAL at 08:30

## 2024-03-12 RX ADMIN — CLOPIDOGREL BISULFATE 75 MG: 75 TABLET ORAL at 10:08

## 2024-03-12 RX ADMIN — CARVEDILOL 12.5 MG: 12.5 TABLET, FILM COATED ORAL at 08:30

## 2024-03-12 RX ADMIN — ATORVASTATIN CALCIUM 80 MG: 80 TABLET, FILM COATED ORAL at 08:30

## 2024-03-12 RX ADMIN — HEPARIN SODIUM 3540 UNITS: 1000 INJECTION INTRAVENOUS; SUBCUTANEOUS at 01:45

## 2024-03-12 RX ADMIN — HEPARIN SODIUM 18 UNITS/KG/HR: 10000 INJECTION, SOLUTION INTRAVENOUS at 09:12

## 2024-03-12 RX ADMIN — HEPARIN SODIUM 20 UNITS/KG/HR: 10000 INJECTION, SOLUTION INTRAVENOUS at 01:46

## 2024-03-12 RX ADMIN — ESCITALOPRAM OXALATE 20 MG: 10 TABLET ORAL at 08:30

## 2024-03-12 RX ADMIN — SODIUM CHLORIDE, PRESERVATIVE FREE 10 ML: 5 INJECTION INTRAVENOUS at 08:33

## 2024-03-12 RX ADMIN — LISINOPRIL 40 MG: 20 TABLET ORAL at 08:30

## 2024-03-12 RX ADMIN — SODIUM CHLORIDE, PRESERVATIVE FREE 10 ML: 5 INJECTION INTRAVENOUS at 08:30

## 2024-03-12 ASSESSMENT — PAIN DESCRIPTION - ORIENTATION: ORIENTATION: LEFT

## 2024-03-12 ASSESSMENT — PAIN DESCRIPTION - LOCATION: LOCATION: BACK;LEG

## 2024-03-12 ASSESSMENT — PAIN DESCRIPTION - DESCRIPTORS: DESCRIPTORS: ACHING

## 2024-03-12 ASSESSMENT — PAIN SCALES - GENERAL
PAINLEVEL_OUTOF10: 0
PAINLEVEL_OUTOF10: 0
PAINLEVEL_OUTOF10: 7

## 2024-03-12 NOTE — ICUWATCH
RRT Clinical Rounding Nurse Progress Report      SUBJECTIVE: Called to assess patient secondary to  recent Code S .      Vitals:    03/11/24 2056 03/12/24 0013 03/12/24 0429 03/12/24 0811   BP: 139/83 119/73 111/71 130/78   Pulse: 93 93 84 88   Resp: 18 18 18 18   Temp: 98.3 °F (36.8 °C) 98.6 °F (37 °C) 98 °F (36.7 °C) 98.1 °F (36.7 °C)   TempSrc:       SpO2: 96% 95% 95% 97%   Weight:   91.7 kg (202 lb 2.6 oz)    Height:            DETERIORATION INDEX SCORE: 31    ASSESSMENT:  On arrival to room, I found patient to be awake sitting in bed. Patient is alert and oriented. Patient and visitor report improvement in dysarthria. NIH 1 for dysarthria. GCS 15. Denies major pain or SOB. Respirations even and unlabored. No needs or concerns at this time.     PLAN:  VS, labs, and progress notes reviewed. No concern per primary RN. Will follow per RRT Clinical Rounding Program protocol. Primary RN to call with concerns.    Mina Valdivia RN  [place]: [phone]

## 2024-03-12 NOTE — PLAN OF CARE
Problem: Discharge Planning  Goal: Discharge to home or other facility with appropriate resources  Outcome: Progressing  Flowsheets  Taken 3/11/2024 2057 by Ny Cain RN  Discharge to home or other facility with appropriate resources:   Identify barriers to discharge with patient and caregiver   Arrange for needed discharge resources and transportation as appropriate   Identify discharge learning needs (meds, wound care, etc)  Taken 3/11/2024 1745 by aDsha Grimm RN  Discharge to home or other facility with appropriate resources:   Identify barriers to discharge with patient and caregiver   Arrange for needed discharge resources and transportation as appropriate   Identify discharge learning needs (meds, wound care, etc)   Refer to discharge planning if patient needs post-hospital services based on physician order or complex needs related to functional status, cognitive ability or social support system     Problem: Safety - Adult  Goal: Free from fall injury  Outcome: Progressing  Flowsheets (Taken 3/11/2024 2057)  Free From Fall Injury:   Instruct family/caregiver on patient safety   Based on caregiver fall risk screen, instruct family/caregiver to ask for assistance with transferring infant if caregiver noted to have fall risk factors     Problem: ABCDS Injury Assessment  Goal: Absence of physical injury  Outcome: Progressing  Flowsheets (Taken 3/11/2024 2057)  Absence of Physical Injury: Implement safety measures based on patient assessment     Problem: Pain  Goal: Verbalizes/displays adequate comfort level or baseline comfort level  Outcome: Progressing     
caregiver fall risk screen, instruct family/caregiver to ask for assistance with transferring infant if caregiver noted to have fall risk factors     Problem: ABCDS Injury Assessment  Goal: Absence of physical injury  3/12/2024 1041 by Gayle Frances RN  Outcome: Completed  3/12/2024 0344 by Ny Cain RN  Outcome: Progressing  Flowsheets (Taken 3/11/2024 2057)  Absence of Physical Injury: Implement safety measures based on patient assessment     Problem: Pain  Goal: Verbalizes/displays adequate comfort level or baseline comfort level  3/12/2024 1041 by Gayle Frances RN  Outcome: Completed  3/12/2024 0344 by Ny Cain RN  Outcome: Progressing

## 2024-03-12 NOTE — PROGRESS NOTES
Neurology Daily Progress Note     Assessment:     84 year old male with episode of dysarthria after a heart cath, now improved. No stroke on MRI. Suspect episode was related to medication effect.    Plan:     No further stroke workup necessary. We will sign off.     Subjective:        Interval history:    Patient doing well. Reports speech is improved, but not completely back to normal.     History:    Jesus Mcgovern Sr. is a 84 y.o. male who is being seen for dysarthria..    Review of systems negative with exception of pertinent positives and negatives noted above.       Objective:     Physical Exam:  General - Well developed, well nourished, in no apparent distress. Pleasant and conversant.   HEENT - Normocephalic, atraumatic. Conjunctiva are clear.   Neck - Supple without masses  Extremities - Peripheral pulses intact. No edema and no rashes.     Neurological examination - Comprehension, attention, memory and reasoning are intact. Language is normal. Speech is slightly dysarthric.   On cranial nerve examination, (II, III, IV, VI) pupils are equal, round, and reactive to light. Extraocular motility is normal. (V, VII) Face is symmetric Motor examination - There is normal muscle tone and bulk. Power is full throughout.     Imaging:    MRI brain negative for infarct     Signed By: MIHAI Wilson - NP     March 12, 2024      I spent 35 minutes today with the patient, which included chart review, obtaining history from the patient/family/other providers, physical exam, documentation, and reviewing pertinent testing.

## 2024-03-12 NOTE — DISCHARGE SUMMARY
[facility] Cardiology Discharge Summary     Patient ID:  Jesus Mcgovern Sr.  [id number]  84 y.o.  1940    Admit date: 3/11/2024    Discharge date:  3/12/2024    Admitting Physician: Miles Cain MD     Discharge Physician: Dr. Cain    Admission Diagnoses: Chest pain [R07.9]  NSTEMI (non-ST elevated myocardial infarction) ([facility]) [I21.4]    Discharge Diagnoses:   Patient Active Problem List    Diagnosis Date Noted    Acute CVA (cerebrovascular accident) ([facility]) 03/11/2024    Pre-op evaluation 06/21/2018    Atrial fibrillation ([facility]) 10/25/2013    Atelectasis 10/24/2013    BPH (benign prostatic hyperplasia) 10/24/2013    Thrombocytopenia ([facility]) 10/24/2013    CAD (coronary artery disease) 10/23/2013    S/P CABG x 2 10/23/2013    HTN (hypertension) 10/23/2013       Cardiology Procedures this admission:  Diagnostic left heart catheterization  EchoCardiogram  Venous duplex BLE  Consults: neurology    Hospital Course: Jesus Mcgovern Sr. is a 84 y.o. male with prior medical history of CAD with CABG x2 (LIMA to LAD & SVG to OM 10/2013) multiple PCIs and most recently s/p MYRA x2 to RCA (5/2023), LV thrombus on Echo 6/2023, HTN, HLD, BPH and had recent L knee procedure on 2/29/2024 and had been off of his DAPT x roughly 3 weeks given this. He presented to D w/ L sided CP and jaw pain. Workup in ED revealed: Hstrop 843, EKG in NSR w/ PACs, CXR WNL. Given CP and elevated troponin off of DAPT Cardiology consulted and admitted pt for further evaluation. Pt was started on IV heparin, ASA, nitro and Echo performed with results as below:  03/11/24    ECHO (TTE) COMPLETE (PRN CONTRAST/BUBBLE/STRAIN/3D) 03/11/2024  4:03 PM (Final)    Interpretation Summary    Left Ventricle: Low normal left ventricular systolic function with an ejection fraction of around 50%. Left ventricle is mildly dilated. Normal wall thickness. There is abnormal septal motion.  There is moderate to severe hypokinesis of the basal to mid

## 2024-03-12 NOTE — CONSULTS
[facility] Hospitalist Consult   Admit Date:  3/11/2024 10:09 AM   Name:  Jesus Mcgovern Sr.   Age:  84 y.o.  Sex:  male  :  1940   MRN:  [id number]   Room:  [facility]    Presenting/Chief Complaint: Chest Pain    Reason(s) for Admission: Chest pain [R07.9]  NSTEMI (non-ST elevated myocardial infarction) (HCC) [I21.4]     Hospitalists consulted by No att. providers found for: Left knee pain    History of Presenting Illness:   Jesus Mcgovern Sr. is a 84 y.o. male with prior medical history of CAD with CABG x2 (LIMA to LAD & SVG to OM 10/2013) multiple PCIs and most recently s/p MYRA x2 to RCA (2023), LV thrombus on Echo 2023, HTN, HLD, BPH and had recent L knee procedure on 2024 and had been off of his DAPT x roughly 3 weeks given this. He presented to [facility] w/ L sided CP and jaw pain. Workup in ED revealed: Hstrop 843, EKG in NSR w/ PACs, CXR WNL. Given CP and elevated troponin off of DAPT Cardiology consulted and admitted pt for further evaluation. Pt was started on IV heparin, ASA, nitro and Echo performed.  Hospitalist was consulted for left knee pain.      Assessment & Plan:     Left knee pain  Patient had recent surgery on .  Wound is healing well, no signs of infection  Elevate leg  Pain management    PT/OT evals and PPD needed/ordered?  Therapy and PPD  Diet:  No diet orders on file  VTE prophylaxis: Lovenox  Code status: Prior    Non-peripheral Lines and Tubes (if present):          Telemetry (if present):  Cardiac/Telemetry Monitor On: Bedside monitor in use, Portable telemetry pack applied        Hospital Problems:  Principal Problem (Resolved):    Chest pain  Active Problems:    HLD (hyperlipidemia)    S/P CABG x 2    HTN (hypertension)    Acute CVA (cerebrovascular accident) (HCC)    Dysarthria        Objective:   Patient Vitals for the past 24 hrs:   Temp Pulse Resp BP SpO2   24 0811 98.1 °F (36.7 °C) 88 18 130/78 97 %   24 0429 98 °F (36.7 °C) 84 18 111/71 95 %   24 0013 
was obtained and shows right vertebral artery disease on my review.  The right vertebral artery appears diminutive and then occluded followed by reconstitution.   The patient was not a candidate for tenecteplase because his only new symptom is dysarthria (NIHSS change of 2).  His left leg weakness is secondary to his recent knee surgery rather than from stroke . The patient was not a candidate for mechanical thrombectomy because of low NIH stroke scale      Plan:     I will discuss with cardiology/primary team regarding antithrombotics.  I would have a very low threshold to start a heparin drip, especially with right vertebral artery disease.    High intensity statin    Continue aspirin    CTA pending    MRI of the brain    Consider repeat transesophageal echocardiogram if this would change medical management    Permissive hypertension to a blood pressure of 220/120 unless contraindicated from other medical conditions    Signed By: Henry Elias DO     March 11, 2024      Patient is critically ill.  Without intervention, there is a high probability of imminent acute organ impairment or life-threatening deterioration.  Total critical care time spent: 31 minutes.

## 2024-03-12 NOTE — ICUWATCH
RRT Clinical Rounding Nurse Update    Vitals:    03/11/24 1749 03/11/24 2056 03/12/24 0013 03/12/24 0429   BP: (!) 125/56 139/83 119/73 111/71   Pulse: 88 93 93 84   Resp: 18 18 18 18   Temp: 98.2 °F (36.8 °C) 98.3 °F (36.8 °C) 98.6 °F (37 °C) 98 °F (36.7 °C)   TempSrc: Oral      SpO2: 96% 96% 95% 95%   Weight:    91.7 kg (202 lb 2.6 oz)   Height:            DETERIORATION INDEX SCORE: 32    ASSESSMENT:  Previous outreach assessment and chart were reviewed. There have been no significant changes since previous assessment.    PLAN:  Will follow per RRT Clinical Rounding Program protocol.    Mey Borden RN  [facility]: [phone]  [facility]: [phone]

## 2024-03-12 NOTE — CARE COORDINATION
Assessment/Plan:  1  S/P right rotator cuff repair  Ambulatory Referral to Physical Therapy    Case request operating room: Right Shoulder Manipulation Under Anesthesia    Case request operating room: Right Shoulder Manipulation Under Anesthesia      2  Adhesive capsulitis of right shoulder  Ambulatory Referral to Physical Therapy    Case request operating room: Right Shoulder Manipulation Under Anesthesia    Case request operating room: Right Shoulder Manipulation Under Anesthesia        Scribe Attestation    I,:  Bozemanmony Velozjennie Call am acting as a scribe while in the presence of the attending physician :       I,:  Carla Plascencia MD personally performed the services described in this documentation    as scribed in my presence :             Shannan's physical therapy reports were reviewed  She continues to struggle with range of motion  In my opinion she has developed adhesive capsulitis postoperatively  We discussed on how to proceed forward  She can continue on this current path with physical therapy versus having a manipulation under esthesia with subsequent therapy  I discussed the procedure in detail  The procedure would be performed under anesthesia with a nerve block  Gentle manipulation will be performed by me to release scar tissue  She will be in a sling following the procedure until the nerve block wears off  Once the block has worn off I will encourage her to move the shoulder as much as tolerated  She will be in physical therapy the day following the procedure and will attend 5 days/week over a 2-week period to maintain motion gained following the manipulation  Risk of the procedure are inclusive of but not limited to bleeding,nerve injury, fracture, worsening of symptoms, not achieving the anticipated results, persistent stiffness, weakness and the need for additional surgery   The patient verbally stated she understood those risks and would like to proceed with the
Chart review complete, CM met with pt and spouse at bedside, pt found sitting up in bed alert and oriented x4, pt lives with spouse in own 2 level home, they live on 1st floor but if needed to reach 2nd floor they have stair chair. Pt has dme in home for use, pt is current with [facility] d/t post lt knee surgery. Pt planning on returning home with home care.  Demographics, insurance and PCP confirmed with pt/family    Pt will need VIPUL order sent to [facility] to continue therapy.    CM staff will remain available to assist with transitions of care please consult.       03/11/24 6542   Service Assessment   Patient Orientation Alert and Oriented   Cognition Alert   History Provided By Patient   Primary Caregiver Self   Accompanied By/Relationship spouse Antonietta   Support Systems Spouse/Significant Other   Patient's Healthcare Decision Maker is: Legal Next of Kin   PCP Verified by CM Yes   Last Visit to PCP Within last 3 months   Prior Functional Level Independent in ADLs/IADLs   Current Functional Level Independent in ADLs/IADLs   Can patient return to prior living arrangement Yes   Ability to make needs known: Good   Family able to assist with home care needs: Yes   Would you like for me to discuss the discharge plan with any other family members/significant others, and if so, who? Yes   Financial Resources Medicare   Community Resources ECF/Home Care   CM/SW Referral Other (see comment)  (na)   Social/Functional History   Lives With Spouse   Type of Home House   Home Layout Two level;Able to Live on Main level with bedroom/bathroom   Home Access Stairs to enter with rails   Entrance Stairs - Number of Steps 2   Bathroom Accessibility Accessible   Home Equipment Cane;Walker, standard;Wheelchair-manual   ADL Assistance Independent   Ambulation Assistance Independent   Transfer Assistance Independent   Active  Yes   Occupation Retired   Services At/After Discharge   Transition of Care Consult (CM Consult) Discharge 
individualized plan of care/goals, treatment preferences, and shares the quality data associated with the providers?  Yes         
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 at a time with the arm supported  She states this stiffness has been limiting her  She obviously cannot paint overhead or at any elevated level  She is very passionate about her art and expresses frustration over the inability to perform as needed  Review of Systems   Constitutional: Negative for chills, fever and unexpected weight change  HENT: Negative for hearing loss, nosebleeds and sore throat  Eyes: Negative for pain, redness and visual disturbance  Respiratory: Negative for cough, shortness of breath and wheezing  Cardiovascular: Negative for chest pain, palpitations and leg swelling  Gastrointestinal: Negative for abdominal pain, nausea and vomiting  Endocrine: Negative for polydipsia and polyuria  Genitourinary: Negative for dysuria and hematuria  Musculoskeletal:        See HPI   Skin: Negative for rash and wound  Neurological: Negative for dizziness, numbness and headaches  Psychiatric/Behavioral: Negative for decreased concentration and suicidal ideas  The patient is not nervous/anxious  Past Medical History:   Diagnosis Date   • Anesthesia complication     pt can still hear, describe whats going on in the room, and feel pressure last 2 surgeries  • Asthma    • Candidiasis of mouth     LA   2/26/15    R    2/25/16     • Exposure to viral disease
LA  Jennie Logan Jennie Logan Jennie Logan 17   R    3/24/16     • Otalgia 2012    LA   12    R   16         Past Surgical History:   Procedure Laterality Date   •  SECTION     • DILATION AND EVACUATION     • NASAL FRACTURE SURGERY     • MT SURGICAL ARTHROSCOPY SHOULDER W/ROTATOR CUFF RPR Right 2022    Procedure: Shoulder Arthroscopy with Rotator Cuff Repair, Biceps tenodesis, and subacromial decompression;  Surgeon: Harini Corral MD;  Location: [address] [place] [address];  Service: Orthopedics   • TONSILLECTOMY     • WRIST GANGLION EXCISION Left        Family History   Problem Relation Age of Onset   • Colon cancer Mother 61   • Hypertension Mother    • Depression Mother    • COPD Mother    • Cancer Mother    • Rheum arthritis Father    • Hyperlipidemia Father    • Depression Father    • Brain cancer Maternal Grandmother    • Diabetes type II Maternal Grandfather    • Diabetes Maternal Grandfather    • Lung cancer Paternal Uncle    • No Known Problems Maternal Uncle        Social History     Occupational History   • Not on file   Tobacco Use   • Smoking status: Former     Packs/day:      Years:      Pack years:      Types: Cigarettes     Start date: 1987     Quit date: 1994     Years since quittin 2   • Smokeless tobacco: Never   Vaping Use   • Vaping Use: Never used   Substance and Sexual Activity   • Alcohol use:  Yes     Alcohol/week: 4 0 standard drinks     Types: 4 Glasses of wine per week     Comment: social    • Drug use: Yes     Types: Marijuana   • Sexual activity: Yes     Partners: Male     Birth control/protection: Male Sterilization         Current Outpatient Medications:   •  albuterol (PROVENTIL HFA,VENTOLIN HFA) 90 mcg/act inhaler, INHALE 2 PUFFS EVERY 6 HOURS AS NEEDED FOR WHEEZING, Disp: 18 g, Rfl: 1  •  Dulera 100-5 MCG/ACT inhaler, INHALE 2 PUFFS 2 (TWO) TIMES A DAY RINSE MOUTH AFTER USE  (Patient taking differently: 2 (two) times a day as needed), Disp: 39 Inhaler,
Rfl: 1  •  levocetirizine (XYZAL) 5 MG tablet, Take 1 tablet (5 mg total) by mouth daily, Disp: 90 tablet, Rfl: 1  •  ZOLMitriptan (ZOMIG) 5 MG tablet, TAKE 1 TABLET BY MOUTH EVERY DAY AS NEEDED FOR MIGRAINE, Disp: 6 tablet, Rfl: 3  •  EPINEPHrine (EPIPEN) 0 3 mg/0 3 mL SOAJ, Inject 0 3 mL (0 3 mg total) into a muscle once for 1 dose, Disp: 0 6 mL, Rfl: 0    Allergies   Allergen Reactions   • Treenut [Nuts - Food Allergy] Anaphylaxis       Objective:  Vitals:    03/15/23 1027   BP: 131/84   Pulse: (!) 51       Right Shoulder Exam     Range of Motion   Active abduction: 100   External rotation: 20   Forward flexion: 120   Internal rotation 0 degrees: Sacrum     Muscle Strength   Abduction: 4/5   Internal rotation: 5/5   External rotation: 5/5     Other   Sensation: normal  Pulse: present (2+ radial)            Physical Exam  Vitals reviewed  Constitutional:       Appearance: She is well-developed  HENT:      Head: Normocephalic and atraumatic  Eyes:      General:         Right eye: No discharge  Left eye: No discharge  Conjunctiva/sclera: Conjunctivae normal    Cardiovascular:      Rate and Rhythm: Regular rhythm  Heart sounds: Normal heart sounds  Pulmonary:      Effort: Pulmonary effort is normal  No respiratory distress  Breath sounds: Normal breath sounds  No stridor  Musculoskeletal:      Cervical back: Normal range of motion and neck supple  Skin:     General: Skin is warm and dry  Neurological:      Mental Status: She is alert and oriented to person, place, and time  Psychiatric:         Behavior: Behavior normal                This document was created using speech voice recognition software  Grammatical errors, random word insertions, pronoun errors, and incomplete sentences are an occasional consequence of this system due to software limitations, ambient noise, and hardware issues     Any formal questions or concerns about content, text, or information contained
within the body of this dictation should be directly addressed to the provider for clarification

## 2024-03-21 NOTE — PROGRESS NOTES
[facility] CARDIOLOGY  [address], [address] [facility], [address] [place] [address]  PHONE: [phone]        22        NAME:  Trey Nissen Sr.  : 1940  MRN: [id number]     CHIEF COMPLAINT:    Coronary Artery Disease      SUBJECTIVE:     No chest pain or palpitation or dizziness. Medications were all reviewed with the patient today and updated as necessary. Current Outpatient Medications   Medication Sig    aspirin 81 MG EC tablet Take 81 mg by mouth daily    ramipril (ALTACE) 10 MG capsule Take 1 capsule by mouth daily (Patient taking differently: Take 10 mg by mouth daily 10 mg pm and 5 mg am)    amLODIPine (NORVASC) 5 MG tablet Take 1 tablet by mouth daily    atorvastatin (LIPITOR) 20 MG tablet Take 20 mg by mouth    carvedilol (COREG) 25 MG tablet Take 25 mg by mouth 2 times daily (with meals)    Cholecalciferol 50 MCG ( UT) TABS Take by mouth daily    cyanocobalamin 1000 MCG tablet Take 1,000 mcg by mouth daily    escitalopram (LEXAPRO) 10 MG tablet Take 5 mg by mouth daily     No current facility-administered medications for this visit. Allergies   Allergen Reactions    Atorvastatin      Other reaction(s): Myalgia-Intolerance    Ciprofloxacin Nausea Only    Metoprolol Other (See Comments)     BP erratic           PHYSICAL EXAM:     Wt Readings from Last 3 Encounters:   22 189 lb 6.4 oz (85.9 kg)   22 195 lb (88.5 kg)   01/10/22 193 lb 9.6 oz (87.8 kg)     BP Readings from Last 3 Encounters:   22 116/78   22 121/65   01/10/22 136/82       /78 (Site: Left Upper Arm, Position: Sitting)   Pulse 68   Ht 5' 10\" (1.778 m)   Wt 189 lb 6.4 oz (85.9 kg)   BMI 27.18 kg/m²     Physical Exam  Vitals reviewed. HENT:      Head: Normocephalic and atraumatic. Eyes:      Extraocular Movements: Extraocular movements intact. Pupils: Pupils are equal, round, and reactive to light. Cardiovascular:      Rate and Rhythm: Normal rate.       Heart sounds: Normal heart sounds. Pulmonary:      Effort: Pulmonary effort is normal.      Breath sounds: Normal breath sounds. Abdominal:      General: Abdomen is flat. Palpations: Abdomen is soft. There is no mass. Musculoskeletal:         General: Normal range of motion. Cervical back: Normal range of motion. Skin:     General: Skin is warm and dry. Neurological:      General: No focal deficit present. Mental Status: He is alert and oriented to person, place, and time. Psychiatric:         Mood and Affect: Mood normal.           RECENT LABS AND RECORDS REVIEW    none      ASSESSMENT and Micaela Zapata was seen today for coronary artery disease. Diagnoses and all orders for this visit:    Precordial pain    Coronary artery disease involving native coronary artery of native heart, unspecified whether angina present    Primary hypertension       CV status is stable. Medications and most recent labs reviewed. Diet and exercise are encouraged. Greater  than 50% of today's visit was devoted to counseling the patient, explaining disease concepts and offering advice and suggestions for optimal care. Return in about 6 months (around 1/13/2023).        Ayo Monroy MD  7/13/2022  9:32 AM No indicators present

## 2024-04-09 ENCOUNTER — OFFICE VISIT (OUTPATIENT)
Age: 84
End: 2024-04-09
Payer: MEDICARE

## 2024-04-09 VITALS
HEART RATE: 82 BPM | DIASTOLIC BLOOD PRESSURE: 64 MMHG | HEIGHT: 70 IN | BODY MASS INDEX: 28.6 KG/M2 | WEIGHT: 199.8 LBS | SYSTOLIC BLOOD PRESSURE: 102 MMHG

## 2024-04-09 DIAGNOSIS — I25.10 ASCVD (ARTERIOSCLEROTIC CARDIOVASCULAR DISEASE): Primary | ICD-10-CM

## 2024-04-09 DIAGNOSIS — I10 PRIMARY HYPERTENSION: ICD-10-CM

## 2024-04-09 PROCEDURE — 1123F ACP DISCUSS/DSCN MKR DOCD: CPT | Performed by: INTERNAL MEDICINE

## 2024-04-09 PROCEDURE — G8417 CALC BMI ABV UP PARAM F/U: HCPCS | Performed by: INTERNAL MEDICINE

## 2024-04-09 PROCEDURE — 1036F TOBACCO NON-USER: CPT | Performed by: INTERNAL MEDICINE

## 2024-04-09 PROCEDURE — G8428 CUR MEDS NOT DOCUMENT: HCPCS | Performed by: INTERNAL MEDICINE

## 2024-04-09 PROCEDURE — 3078F DIAST BP <80 MM HG: CPT | Performed by: INTERNAL MEDICINE

## 2024-04-09 PROCEDURE — 99214 OFFICE O/P EST MOD 30 MIN: CPT | Performed by: INTERNAL MEDICINE

## 2024-04-09 PROCEDURE — 3074F SYST BP LT 130 MM HG: CPT | Performed by: INTERNAL MEDICINE

## 2024-04-09 RX ORDER — ISOSORBIDE MONONITRATE 30 MG/1
30 TABLET, EXTENDED RELEASE ORAL DAILY
Qty: 90 TABLET | Refills: 3 | Status: SHIPPED | OUTPATIENT
Start: 2024-04-09

## 2024-04-09 RX ORDER — RAMIPRIL 10 MG/1
10 CAPSULE ORAL DAILY
Qty: 90 CAPSULE | Refills: 3 | Status: SHIPPED | OUTPATIENT
Start: 2024-04-09

## 2024-04-09 RX ORDER — ATORVASTATIN CALCIUM 80 MG/1
80 TABLET, FILM COATED ORAL DAILY
Qty: 90 TABLET | Refills: 3 | Status: SHIPPED | OUTPATIENT
Start: 2024-04-09

## 2024-04-09 RX ORDER — CLOPIDOGREL BISULFATE 75 MG/1
75 TABLET ORAL DAILY
Qty: 90 TABLET | Refills: 3 | Status: SHIPPED | OUTPATIENT
Start: 2024-04-09 | End: 2025-04-04

## 2024-04-09 RX ORDER — NITROGLYCERIN 0.4 MG/1
0.4 TABLET SUBLINGUAL EVERY 5 MIN PRN
Qty: 25 TABLET | Refills: 3 | Status: SHIPPED | OUTPATIENT
Start: 2024-04-09

## 2024-04-09 RX ORDER — EZETIMIBE 10 MG/1
10 TABLET ORAL DAILY
Qty: 90 TABLET | Refills: 3 | Status: SHIPPED | OUTPATIENT
Start: 2024-04-09

## 2024-04-09 RX ORDER — NITROGLYCERIN 0.4 MG/1
0.4 TABLET SUBLINGUAL EVERY 5 MIN PRN
Qty: 25 TABLET | Refills: 2 | Status: CANCELLED | OUTPATIENT
Start: 2024-04-09

## 2024-04-09 RX ORDER — CARVEDILOL 12.5 MG/1
12.5 TABLET ORAL 2 TIMES DAILY
Qty: 180 TABLET | Refills: 3 | Status: SHIPPED | OUTPATIENT
Start: 2024-04-09

## 2024-04-09 NOTE — PROGRESS NOTES
[facility] CARDIOLOGY  [address] [place] [address], [address]  [place] [address]  PHONE: [phone]        24        NAME:  Jesus Mcgovern Sr.  : 1940  MRN: [id number]     CHIEF COMPLAINT:    Atrial Fibrillation, Coronary Artery Disease, Hypertension, and Hyperlipidemia         SUBJECTIVE:     Last visit w/ me Sept.  He had his knee replaced 6 weeks ago..   He was re-hosp for chest pain 3 weeks ago. Had a negative cath but there was suspicion for a post cath cva.  Currently doing well.No chest pain or palpitation or dizziness.       Medications were all reviewed with the patient today and updated as necessary.   Current Outpatient Medications   Medication Sig    isosorbide mononitrate (IMDUR) 30 MG extended release tablet Take 1 tablet by mouth daily    traMADol (ULTRAM) 50 MG tablet Take 1 tablet by mouth every 6 hours as needed for Pain.    aspirin 81 MG EC tablet Take 1 tablet by mouth daily    hypromellose (ISOPTO TEARS) 0.5 % ophthalmic solution 1 drop in the morning, at noon, and at bedtime Right eye    ezetimibe (ZETIA) 10 MG tablet     nitroGLYCERIN (NITROSTAT) 0.4 MG SL tablet     ramipril (ALTACE) 10 MG capsule TAKE ONE CAPSULE BY MOUTH ONE TIME DAILY    clopidogrel (PLAVIX) 75 MG tablet Take 1 tablet by mouth daily    pantoprazole (PROTONIX) 40 MG tablet Take 1 tablet by mouth daily    atorvastatin (LIPITOR) 80 MG tablet Take 1 tablet by mouth daily    Cholecalciferol 50 MCG (2000 UT) TABS Take by mouth daily    cyanocobalamin 1000 MCG tablet Take 1 tablet by mouth daily    escitalopram (LEXAPRO) 10 MG tablet Take 2 tablets by mouth daily     No current facility-administered medications for this visit.        Allergies   Allergen Reactions    Ciprofloxacin Nausea Only    Metoprolol Other (See Comments)     BP erratic           PHYSICAL EXAM:     Wt Readings from Last 3 Encounters:   24 90.6 kg (199 lb 12.8 oz)   24 91.7 kg (202 lb 2.6 oz)   23 87.4 kg (192 lb 11.2 oz)

## 2024-10-18 ENCOUNTER — OFFICE VISIT (OUTPATIENT)
Age: 84
End: 2024-10-18
Payer: MEDICARE

## 2024-10-18 VITALS
HEIGHT: 70 IN | WEIGHT: 203 LBS | SYSTOLIC BLOOD PRESSURE: 108 MMHG | HEART RATE: 70 BPM | BODY MASS INDEX: 29.06 KG/M2 | DIASTOLIC BLOOD PRESSURE: 68 MMHG

## 2024-10-18 DIAGNOSIS — G62.9 NEUROPATHY: Primary | ICD-10-CM

## 2024-10-18 PROCEDURE — G8428 CUR MEDS NOT DOCUMENT: HCPCS | Performed by: INTERNAL MEDICINE

## 2024-10-18 PROCEDURE — G8417 CALC BMI ABV UP PARAM F/U: HCPCS | Performed by: INTERNAL MEDICINE

## 2024-10-18 PROCEDURE — 3078F DIAST BP <80 MM HG: CPT | Performed by: INTERNAL MEDICINE

## 2024-10-18 PROCEDURE — G8484 FLU IMMUNIZE NO ADMIN: HCPCS | Performed by: INTERNAL MEDICINE

## 2024-10-18 PROCEDURE — 1036F TOBACCO NON-USER: CPT | Performed by: INTERNAL MEDICINE

## 2024-10-18 PROCEDURE — 1123F ACP DISCUSS/DSCN MKR DOCD: CPT | Performed by: INTERNAL MEDICINE

## 2024-10-18 PROCEDURE — 99214 OFFICE O/P EST MOD 30 MIN: CPT | Performed by: INTERNAL MEDICINE

## 2024-10-18 PROCEDURE — 3074F SYST BP LT 130 MM HG: CPT | Performed by: INTERNAL MEDICINE

## 2024-10-18 NOTE — PROGRESS NOTES
[facility] CARDIOLOGY  [address] [place] [address], [address] [place] [address]  PHONE: [phone]        10/18/24        NAME:  Jesus Mcgovern .  : 1940  MRN: [id number]     Ascvd, prior cabg, pci rca 2023, last cath 3/2024  Htn  Neuropathy    CHIEF COMPLAINT:    Atrial Fibrillation, Hyperlipidemia, and Hypertension      SUBJECTIVE:     Doing well.   + chest tightness and jaw pain x 2 months. Relieved w/ Ntg.  Neuropathy an issue. Frequent falling.     Medications were all reviewed with the patient today and updated as necessary.   Current Outpatient Medications   Medication Sig    atorvastatin (LIPITOR) 80 MG tablet Take 1 tablet by mouth daily    ramipril (ALTACE) 10 MG capsule Take 1 capsule by mouth daily    carvedilol (COREG) 12.5 MG tablet Take 1 tablet by mouth 2 times daily    ezetimibe (ZETIA) 10 MG tablet Take 1 tablet by mouth daily    isosorbide mononitrate (IMDUR) 30 MG extended release tablet Take 1 tablet by mouth daily    nitroGLYCERIN (NITROSTAT) 0.4 MG SL tablet Place 1 tablet under the tongue every 5 minutes as needed for Chest pain    traMADol (ULTRAM) 50 MG tablet Take 1 tablet by mouth every 6 hours as needed for Pain.    aspirin 81 MG EC tablet Take 1 tablet by mouth daily    Cholecalciferol 50 MCG (2000 UT) TABS Take by mouth daily    cyanocobalamin 1000 MCG tablet Take 1 tablet by mouth daily    escitalopram (LEXAPRO) 10 MG tablet Take 2 tablets by mouth daily    clopidogrel (PLAVIX) 75 MG tablet Take 1 tablet by mouth daily (Patient not taking: Reported on 10/18/2024)    hypromellose (ISOPTO TEARS) 0.5 % ophthalmic solution 1 drop in the morning, at noon, and at bedtime Right eye (Patient not taking: Reported on 10/18/2024)    pantoprazole (PROTONIX) 40 MG tablet Take 1 tablet by mouth daily     No current facility-administered medications for this visit.        Allergies   Allergen Reactions    Ciprofloxacin Nausea Only    Metoprolol Other (See Comments)     BP erratic

## 2025-02-10 ENCOUNTER — TELEPHONE (OUTPATIENT)
Age: 85
End: 2025-02-10

## 2025-02-10 NOTE — TELEPHONE ENCOUNTER
Pt.c/o fatigue/no energy in past month.He denies any CP.He is SOB most of the time now.He would like an appt.    Appt.made 3:15 tomorrow.

## 2025-02-10 NOTE — TELEPHONE ENCOUNTER
Patient calling stating that for about a month now he has been SOB and no Energy at all. No chest pain, please advise

## 2025-02-11 ENCOUNTER — OFFICE VISIT (OUTPATIENT)
Age: 85
End: 2025-02-11
Payer: MEDICARE

## 2025-02-11 VITALS
HEART RATE: 101 BPM | WEIGHT: 208 LBS | HEIGHT: 70 IN | BODY MASS INDEX: 29.78 KG/M2 | SYSTOLIC BLOOD PRESSURE: 138 MMHG | DIASTOLIC BLOOD PRESSURE: 84 MMHG

## 2025-02-11 DIAGNOSIS — R06.02 SHORTNESS OF BREATH: ICD-10-CM

## 2025-02-11 DIAGNOSIS — I10 PRIMARY HYPERTENSION: ICD-10-CM

## 2025-02-11 DIAGNOSIS — I25.10 ASCVD (ARTERIOSCLEROTIC CARDIOVASCULAR DISEASE): Primary | ICD-10-CM

## 2025-02-11 LAB
ALBUMIN SERPL-MCNC: 3.9 G/DL (ref 3.2–4.6)
ALBUMIN/GLOB SERPL: 1.5 (ref 1–1.9)
ALP SERPL-CCNC: 199 U/L (ref 40–129)
ALT SERPL-CCNC: 33 U/L (ref 8–55)
ANION GAP SERPL CALC-SCNC: 12 MMOL/L (ref 7–16)
AST SERPL-CCNC: 34 U/L (ref 15–37)
BASOPHILS # BLD: 0.03 K/UL (ref 0–0.2)
BASOPHILS NFR BLD: 0.3 % (ref 0–2)
BILIRUB SERPL-MCNC: 0.3 MG/DL (ref 0–1.2)
BUN SERPL-MCNC: 22 MG/DL (ref 8–23)
CALCIUM SERPL-MCNC: 9.1 MG/DL (ref 8.8–10.2)
CHLORIDE SERPL-SCNC: 107 MMOL/L (ref 98–107)
CO2 SERPL-SCNC: 24 MMOL/L (ref 20–29)
CREAT SERPL-MCNC: 0.96 MG/DL (ref 0.8–1.3)
D DIMER PPP FEU-MCNC: 0.34 UG/ML(FEU)
DIFFERENTIAL METHOD BLD: ABNORMAL
EOSINOPHIL # BLD: 0.24 K/UL (ref 0–0.8)
EOSINOPHIL NFR BLD: 2.1 % (ref 0.5–7.8)
ERYTHROCYTE [DISTWIDTH] IN BLOOD BY AUTOMATED COUNT: 13.3 % (ref 11.9–14.6)
GLOBULIN SER CALC-MCNC: 2.6 G/DL (ref 2.3–3.5)
GLUCOSE SERPL-MCNC: 109 MG/DL (ref 70–99)
HCT VFR BLD AUTO: 49.4 % (ref 41.1–50.3)
HGB BLD-MCNC: 16.2 G/DL (ref 13.6–17.2)
IMM GRANULOCYTES # BLD AUTO: 0.05 K/UL (ref 0–0.5)
IMM GRANULOCYTES NFR BLD AUTO: 0.4 % (ref 0–5)
LYMPHOCYTES # BLD: 1.82 K/UL (ref 0.5–4.6)
LYMPHOCYTES NFR BLD: 16.2 % (ref 13–44)
MCH RBC QN AUTO: 30.1 PG (ref 26.1–32.9)
MCHC RBC AUTO-ENTMCNC: 32.8 G/DL (ref 31.4–35)
MCV RBC AUTO: 91.7 FL (ref 82–102)
MONOCYTES # BLD: 0.75 K/UL (ref 0.1–1.3)
MONOCYTES NFR BLD: 6.7 % (ref 4–12)
NEUTS SEG # BLD: 8.34 K/UL (ref 1.7–8.2)
NEUTS SEG NFR BLD: 74.3 % (ref 43–78)
NRBC # BLD: 0 K/UL (ref 0–0.2)
NT PRO BNP: 206 PG/ML (ref 0–450)
PLATELET # BLD AUTO: 169 K/UL (ref 150–450)
PMV BLD AUTO: 10.3 FL (ref 9.4–12.3)
POTASSIUM SERPL-SCNC: 4.4 MMOL/L (ref 3.5–5.1)
PROT SERPL-MCNC: 6.6 G/DL (ref 6.3–8.2)
RBC # BLD AUTO: 5.39 M/UL (ref 4.23–5.6)
SODIUM SERPL-SCNC: 143 MMOL/L (ref 136–145)
TROPONIN T SERPL HS-MCNC: 27 NG/L (ref 0–22)
TSH, 3RD GENERATION: 1.92 UIU/ML (ref 0.27–4.2)
WBC # BLD AUTO: 11.2 K/UL (ref 4.3–11.1)

## 2025-02-11 PROCEDURE — G8417 CALC BMI ABV UP PARAM F/U: HCPCS | Performed by: INTERNAL MEDICINE

## 2025-02-11 PROCEDURE — 1036F TOBACCO NON-USER: CPT | Performed by: INTERNAL MEDICINE

## 2025-02-11 PROCEDURE — 3075F SYST BP GE 130 - 139MM HG: CPT | Performed by: INTERNAL MEDICINE

## 2025-02-11 PROCEDURE — 99214 OFFICE O/P EST MOD 30 MIN: CPT | Performed by: INTERNAL MEDICINE

## 2025-02-11 PROCEDURE — 1126F AMNT PAIN NOTED NONE PRSNT: CPT | Performed by: INTERNAL MEDICINE

## 2025-02-11 PROCEDURE — 1123F ACP DISCUSS/DSCN MKR DOCD: CPT | Performed by: INTERNAL MEDICINE

## 2025-02-11 PROCEDURE — G8428 CUR MEDS NOT DOCUMENT: HCPCS | Performed by: INTERNAL MEDICINE

## 2025-02-11 PROCEDURE — 3079F DIAST BP 80-89 MM HG: CPT | Performed by: INTERNAL MEDICINE

## 2025-02-11 PROCEDURE — 93000 ELECTROCARDIOGRAM COMPLETE: CPT | Performed by: INTERNAL MEDICINE

## 2025-02-11 RX ORDER — CARVEDILOL 25 MG/1
25 TABLET ORAL 2 TIMES DAILY
Qty: 180 TABLET | Refills: 3 | Status: SHIPPED | OUTPATIENT
Start: 2025-02-11

## 2025-02-11 RX ORDER — ESCITALOPRAM OXALATE 20 MG/1
20 TABLET ORAL DAILY
COMMUNITY
Start: 2024-12-23

## 2025-02-11 NOTE — PROGRESS NOTES
[facility] CARDIOLOGY  [address] [place] [address], [address]  [place] [address]  PHONE: [phone]        25        NAME:  Jesus Mcgovern .  : 1940  MRN: [id number]     Ascvd, prior cabg, pci rca 2023, last cath 3/2024  Htn  Neuropathy    CHIEF COMPLAINT:    Shortness of Breath, Atrial Fibrillation, and Hypertension      SUBJECTIVE:     Pt has had onset of marked BOSWELL x 1 month.  No angina.  Hgb 15.5 2 months ago    / Medications were all reviewed with the patient today and updated as necessary.   Current Outpatient Medications   Medication Sig    escitalopram (LEXAPRO) 20 MG tablet Take 1 tablet by mouth daily    carvedilol (COREG) 25 MG tablet Take 1 tablet by mouth 2 times daily    atorvastatin (LIPITOR) 80 MG tablet Take 1 tablet by mouth daily    ramipril (ALTACE) 10 MG capsule Take 1 capsule by mouth daily    ezetimibe (ZETIA) 10 MG tablet Take 1 tablet by mouth daily    isosorbide mononitrate (IMDUR) 30 MG extended release tablet Take 1 tablet by mouth daily    nitroGLYCERIN (NITROSTAT) 0.4 MG SL tablet Place 1 tablet under the tongue every 5 minutes as needed for Chest pain    aspirin 81 MG EC tablet Take 1 tablet by mouth daily    Cholecalciferol 50 MCG (2000 UT) TABS Take by mouth daily    cyanocobalamin 1000 MCG tablet Take 1 tablet by mouth daily    clopidogrel (PLAVIX) 75 MG tablet Take 1 tablet by mouth daily (Patient not taking: Reported on 10/18/2024)    traMADol (ULTRAM) 50 MG tablet Take 1 tablet by mouth every 6 hours as needed for Pain. (Patient not taking: Reported on 2025)    hypromellose (ISOPTO TEARS) 0.5 % ophthalmic solution 1 drop in the morning, at noon, and at bedtime Right eye (Patient not taking: Reported on 10/18/2024)     No current facility-administered medications for this visit.        Allergies   Allergen Reactions    Ciprofloxacin Nausea Only    Metoprolol Other (See Comments)     BP erratic           PHYSICAL EXAM:     Wt Readings from Last 3 Encounters:

## 2025-02-12 ENCOUNTER — TELEPHONE (OUTPATIENT)
Age: 85
End: 2025-02-12

## 2025-02-12 NOTE — TELEPHONE ENCOUNTER
Needs letter from doctor stating pateints diagnosis and why he can not travel. They had a flight booked but Dr. Arnold told pt not to travel. They need this for travel insurance. He has an appt on Friday 02-14 and would like to pick the letter up then.

## 2025-02-12 NOTE — TELEPHONE ENCOUNTER
Called patient and was unable to LVM because mailbox was full. Letter will be available for  at the [place] office on Friday.

## 2025-02-25 ENCOUNTER — OFFICE VISIT (OUTPATIENT)
Age: 85
End: 2025-02-25
Payer: MEDICARE

## 2025-02-25 VITALS
WEIGHT: 202 LBS | DIASTOLIC BLOOD PRESSURE: 62 MMHG | HEIGHT: 70 IN | BODY MASS INDEX: 28.92 KG/M2 | HEART RATE: 72 BPM | SYSTOLIC BLOOD PRESSURE: 102 MMHG

## 2025-02-25 DIAGNOSIS — R06.09 DOE (DYSPNEA ON EXERTION): Primary | ICD-10-CM

## 2025-02-25 DIAGNOSIS — I10 PRIMARY HYPERTENSION: ICD-10-CM

## 2025-02-25 DIAGNOSIS — I25.10 ASCVD (ARTERIOSCLEROTIC CARDIOVASCULAR DISEASE): ICD-10-CM

## 2025-02-25 PROCEDURE — 1123F ACP DISCUSS/DSCN MKR DOCD: CPT | Performed by: INTERNAL MEDICINE

## 2025-02-25 PROCEDURE — G8417 CALC BMI ABV UP PARAM F/U: HCPCS | Performed by: INTERNAL MEDICINE

## 2025-02-25 PROCEDURE — 99214 OFFICE O/P EST MOD 30 MIN: CPT | Performed by: INTERNAL MEDICINE

## 2025-02-25 PROCEDURE — 1036F TOBACCO NON-USER: CPT | Performed by: INTERNAL MEDICINE

## 2025-02-25 PROCEDURE — 3074F SYST BP LT 130 MM HG: CPT | Performed by: INTERNAL MEDICINE

## 2025-02-25 PROCEDURE — 1126F AMNT PAIN NOTED NONE PRSNT: CPT | Performed by: INTERNAL MEDICINE

## 2025-02-25 PROCEDURE — G8428 CUR MEDS NOT DOCUMENT: HCPCS | Performed by: INTERNAL MEDICINE

## 2025-02-25 PROCEDURE — 3078F DIAST BP <80 MM HG: CPT | Performed by: INTERNAL MEDICINE

## 2025-02-25 NOTE — PROGRESS NOTES
[facility] CARDIOLOGY  [address] [place] [address], [address] [place] [address]  PHONE: [phone]        25        NAME:  Jesus Mcgovern .  : 1940  MRN: [id number]     Ascvd, prior cabg, pci rca 2023, last cath 3/2024  Htn  Neuropathy    CHIEF COMPLAINT:    Shortness of Breath (Echo )    SUBJECTIVE:     Dyspnea w/ exertion persists.  + tight w/ deep breath       Medications were all reviewed with the patient today and updated as necessary.   Current Outpatient Medications   Medication Sig    escitalopram (LEXAPRO) 20 MG tablet Take 1 tablet by mouth daily    carvedilol (COREG) 25 MG tablet Take 1 tablet by mouth 2 times daily    atorvastatin (LIPITOR) 80 MG tablet Take 1 tablet by mouth daily    ramipril (ALTACE) 10 MG capsule Take 1 capsule by mouth daily    ezetimibe (ZETIA) 10 MG tablet Take 1 tablet by mouth daily    isosorbide mononitrate (IMDUR) 30 MG extended release tablet Take 1 tablet by mouth daily    nitroGLYCERIN (NITROSTAT) 0.4 MG SL tablet Place 1 tablet under the tongue every 5 minutes as needed for Chest pain    traMADol (ULTRAM) 50 MG tablet Take 1 tablet by mouth every 6 hours as needed for Pain.    Cholecalciferol 50 MCG (2000 UT) TABS Take by mouth daily    cyanocobalamin 1000 MCG tablet Take 1 tablet by mouth daily    clopidogrel (PLAVIX) 75 MG tablet Take 1 tablet by mouth daily (Patient not taking: Reported on 10/18/2024)    aspirin 81 MG EC tablet Take 1 tablet by mouth daily (Patient not taking: Reported on 2025)    hypromellose (ISOPTO TEARS) 0.5 % ophthalmic solution 1 drop in the morning, at noon, and at bedtime Right eye     No current facility-administered medications for this visit.        Allergies   Allergen Reactions    Ciprofloxacin Nausea Only    Metoprolol Other (See Comments)     BP erratic           PHYSICAL EXAM:     Wt Readings from Last 3 Encounters:   25 91.6 kg (202 lb)   25 94.3 kg (208 lb)   25 94.3 kg (208 lb)     BP Readings

## 2025-03-03 PROBLEM — R06.02 SOB (SHORTNESS OF BREATH): Status: ACTIVE | Noted: 2025-02-25

## 2025-03-03 PROBLEM — I20.0 ACCELERATING ANGINA (HCC): Status: ACTIVE | Noted: 2025-02-25

## 2025-03-11 NOTE — PROGRESS NOTES
Patient pre-assessment complete for [facility] scheduled for 0930, arrival time 0600. Patient verified using . Patient instructed to bring a list of all home medications on the day of procedure. NPO status reinforced. Patient informed to take a full dose aspirin 325mg  or 81 mg x 4 and 75 mg plavix on the day of procedure. Instructed they can take all other medications excluding vitamins & supplements. Patient verbalizes understanding of all instructions & denies any questions at this time.

## 2025-03-12 ENCOUNTER — HOSPITAL ENCOUNTER (OUTPATIENT)
Age: 85
Setting detail: OUTPATIENT SURGERY
Discharge: HOME | End: 2025-03-12
Attending: INTERNAL MEDICINE | Admitting: INTERNAL MEDICINE
Payer: MEDICARE

## 2025-03-12 VITALS
HEART RATE: 81 BPM | TEMPERATURE: 98 F | SYSTOLIC BLOOD PRESSURE: 139 MMHG | WEIGHT: 202 LBS | RESPIRATION RATE: 23 BRPM | OXYGEN SATURATION: 94 % | BODY MASS INDEX: 28.92 KG/M2 | DIASTOLIC BLOOD PRESSURE: 81 MMHG | HEIGHT: 70 IN

## 2025-03-12 DIAGNOSIS — R06.02 SOB (SHORTNESS OF BREATH): ICD-10-CM

## 2025-03-12 DIAGNOSIS — I20.0 ACCELERATING ANGINA (HCC): ICD-10-CM

## 2025-03-12 LAB
ANION GAP SERPL CALC-SCNC: 15 MMOL/L (ref 7–16)
BUN SERPL-MCNC: 30 MG/DL (ref 8–23)
CALCIUM SERPL-MCNC: 8.7 MG/DL (ref 8.8–10.2)
CHLORIDE SERPL-SCNC: 106 MMOL/L (ref 98–107)
CO2 SERPL-SCNC: 20 MMOL/L (ref 20–29)
CREAT SERPL-MCNC: 1.15 MG/DL (ref 0.8–1.3)
EKG ATRIAL RATE: 76 BPM
EKG DIAGNOSIS: NORMAL
EKG P AXIS: 28 DEGREES
EKG P-R INTERVAL: 146 MS
EKG Q-T INTERVAL: 396 MS
EKG QRS DURATION: 94 MS
EKG QTC CALCULATION (BAZETT): 445 MS
EKG R AXIS: -49 DEGREES
EKG T AXIS: 61 DEGREES
EKG VENTRICULAR RATE: 76 BPM
ERYTHROCYTE [DISTWIDTH] IN BLOOD BY AUTOMATED COUNT: 13.4 % (ref 11.9–14.6)
GLUCOSE SERPL-MCNC: 109 MG/DL (ref 70–99)
HCT VFR BLD AUTO: 45.5 % (ref 41.1–50.3)
HGB BLD-MCNC: 15.6 G/DL (ref 13.6–17.2)
MAGNESIUM SERPL-MCNC: 2.2 MG/DL (ref 1.8–2.4)
MCH RBC QN AUTO: 29.7 PG (ref 26.1–32.9)
MCHC RBC AUTO-ENTMCNC: 34.3 G/DL (ref 31.4–35)
MCV RBC AUTO: 86.5 FL (ref 82–102)
NRBC # BLD: 0 K/UL (ref 0–0.2)
PLATELET # BLD AUTO: 169 K/UL (ref 150–450)
PMV BLD AUTO: 9.7 FL (ref 9.4–12.3)
POTASSIUM SERPL-SCNC: 4.3 MMOL/L (ref 3.5–5.1)
RBC # BLD AUTO: 5.26 M/UL (ref 4.23–5.6)
SODIUM SERPL-SCNC: 141 MMOL/L (ref 136–145)
WBC # BLD AUTO: 9.9 K/UL (ref 4.3–11.1)

## 2025-03-12 PROCEDURE — 80048 BASIC METABOLIC PNL TOTAL CA: CPT

## 2025-03-12 PROCEDURE — 2709999900 HC NON-CHARGEABLE SUPPLY: Performed by: INTERNAL MEDICINE

## 2025-03-12 PROCEDURE — C1769 GUIDE WIRE: HCPCS | Performed by: INTERNAL MEDICINE

## 2025-03-12 PROCEDURE — 93005 ELECTROCARDIOGRAM TRACING: CPT | Performed by: INTERNAL MEDICINE

## 2025-03-12 PROCEDURE — C1894 INTRO/SHEATH, NON-LASER: HCPCS | Performed by: INTERNAL MEDICINE

## 2025-03-12 PROCEDURE — 6360000004 HC RX CONTRAST MEDICATION: Performed by: INTERNAL MEDICINE

## 2025-03-12 PROCEDURE — 6360000002 HC RX W HCPCS: Performed by: INTERNAL MEDICINE

## 2025-03-12 PROCEDURE — 2580000003 HC RX 258: Performed by: INTERNAL MEDICINE

## 2025-03-12 PROCEDURE — 85027 COMPLETE CBC AUTOMATED: CPT

## 2025-03-12 PROCEDURE — 83735 ASSAY OF MAGNESIUM: CPT

## 2025-03-12 PROCEDURE — 93459 L HRT ART/GRFT ANGIO: CPT | Performed by: INTERNAL MEDICINE

## 2025-03-12 PROCEDURE — 99152 MOD SED SAME PHYS/QHP 5/>YRS: CPT | Performed by: INTERNAL MEDICINE

## 2025-03-12 PROCEDURE — 2500000003 HC RX 250 WO HCPCS: Performed by: INTERNAL MEDICINE

## 2025-03-12 RX ORDER — MIDAZOLAM HYDROCHLORIDE 1 MG/ML
INJECTION, SOLUTION INTRAMUSCULAR; INTRAVENOUS PRN
Status: DISCONTINUED | OUTPATIENT
Start: 2025-03-12 | End: 2025-03-12 | Stop reason: HOSPADM

## 2025-03-12 RX ORDER — SODIUM CHLORIDE 9 MG/ML
INJECTION, SOLUTION INTRAVENOUS CONTINUOUS
Status: DISCONTINUED | OUTPATIENT
Start: 2025-03-12 | End: 2025-03-12 | Stop reason: HOSPADM

## 2025-03-12 RX ORDER — LIDOCAINE HYDROCHLORIDE 10 MG/ML
INJECTION, SOLUTION INFILTRATION; PERINEURAL PRN
Status: DISCONTINUED | OUTPATIENT
Start: 2025-03-12 | End: 2025-03-12 | Stop reason: HOSPADM

## 2025-03-12 RX ORDER — IOPAMIDOL 755 MG/ML
INJECTION, SOLUTION INTRAVASCULAR PRN
Status: DISCONTINUED | OUTPATIENT
Start: 2025-03-12 | End: 2025-03-12 | Stop reason: HOSPADM

## 2025-03-12 RX ORDER — HEPARIN SODIUM 200 [USP'U]/100ML
INJECTION, SOLUTION INTRAVENOUS CONTINUOUS PRN
Status: DISCONTINUED | OUTPATIENT
Start: 2025-03-12 | End: 2025-03-12 | Stop reason: HOSPADM

## 2025-03-12 RX ORDER — ASPIRIN 81 MG/1
324 TABLET, CHEWABLE ORAL ONCE
Status: DISCONTINUED | OUTPATIENT
Start: 2025-03-12 | End: 2025-03-12 | Stop reason: HOSPADM

## 2025-03-12 RX ADMIN — SODIUM CHLORIDE: 9 INJECTION, SOLUTION INTRAVENOUS at 07:00

## 2025-03-12 NOTE — PROGRESS NOTES
TRANSFER - IN REPORT:    Verbal report received from KARMEN Del Real on Jesus Mcgovern Sr. being received from [facility] for routine progression of patient care      Report consisted of patient’s Situation, Background, Assessment and Recommendations(SBAR).     Information from the following report(s) SBAR, Kardex, Procedure Summary, MAR, and Recent Results was reviewed with the receiving nurse.    Opportunity for questions and clarification was provided.      Assessment completed upon patient’s arrival to unit and care assumed.

## 2025-03-12 NOTE — PROGRESS NOTES
Increase Lantus Insulin to 12 Units. No labs are ordered for 3 months. Will check A1c in office and or get labs that day. Patient received to CPRU room # 11  Ambulatory from [facility]. Patient scheduled for C today with Dr Arnold. Procedure reviewed & questions answered, voiced good understanding consent obtained & placed on chart. All medications and medical history reviewed. Will prep patient per orders. Patient & family updated on plan of care.      The patient has a fraility score of 5-MILDLY FRAIL, based on use of cane with ambulation.  Pt is unsteady and has falls recently.    Pt took 650mg of Aspirin and 75mg of Plavix at 0500

## 2025-03-12 NOTE — PROGRESS NOTES
TRANSFER - OUT REPORT:    Verbal report given to KARMEN Benavides on Jesus HOLLEY Mcgovern Sr.  being transferred to [facility]U for ordered procedure       Report consisted of patient’s Situation, Background, Assessment and Recommendations(SBAR).     Information from the following report(s) Procedure Summary, MAR, and Med Rec Status was reviewed with the receiving nurse.    Opportunity for questions and clarification was provided.      C by Dr. Arnold  Left radial access  No interventions  Left radial band with 10 mL band  2 mg Versed  5,000 units of heparin  Access site soft. Clean, dry, and intact; with no signs of bleeding or hematoma  Pt. Tolerated procedure

## 2025-03-12 NOTE — PROGRESS NOTES
Radial compression band removed at 1145 after slowly reducing air from 12 cc to zero as per hospital protocol. No bleeding or hematoma noted. 2 x 2 gauze with tegaderm placed over puncture site. The affected extremity is warm and dry to the touch. Frequent vital signs documented per flowheet. Patient instructed to call if any bleeding noted on gauze. Patient verbalized understanding the nursing instructions.

## 2025-03-12 NOTE — DISCHARGE INSTRUCTIONS
HEART CATHETERIZATION/ANGIOGRAPHY DISCHARGE INSTRUCTIONS    Check puncture site frequently for swelling or bleeding. If there is any bleeding, apply pressure over the area with a clean towel or washcloth and call 911. Notify your doctor for any redness, swelling, drainage, or oozing from the puncture site. Notify your doctor for any fever or chills.  If the extremity becomes cold, numb, or painful call Dr. Arnold at [phone].  Activity should be limited for the next 48 hours. No heavy lifting, pushing, pulling  or strenuous activity for 48 hours. No heavy lifting (anything over 10 pounds) for 3 days.  You may resume your usual diet. Drink more fluids than usual.  Have a responsible person drive you home and stay with you for at least 24 hours after your heart catheterization/angiography.  You may remove bandage from your left hand in 24 hours. You may shower in 24 hours. No tub baths, hot tubs, or swimming for 1 week. Do not place any lotions, creams, powders, or ointments over puncture site for 1 week. You may place a clean band-aid over the puncture site each day for 5 days. Change daily.        Sedation for a Medical Procedure: Care Instructions     You were given a sedative medication during your visit. While many of the effects will have worn   off before you leave; you may continue to feel some effects for several hours.      Common side effects from sedation include:  Feeling sleepy. (Your doctors and nurses will make sure you are not too sleepy to go home.)  Nausea and vomiting. This usually does not last long.  Feeling tired.     How can you care for yourself at home?  Activity    Don't do anything for 24 hours that requires attention to detail. It takes time for the medicine effects to completely wear off.     Do not make important legal decisions for 24 hours.     Do not sign any legal documents for 24 hours.     Do not drink alcohol today     For your safety, you should not drive or operate heavy

## 2025-03-13 LAB — ECHO BSA: 2.13 M2

## 2025-03-19 ENCOUNTER — OFFICE VISIT (OUTPATIENT)
Dept: PULMONOLOGY | Age: 85
End: 2025-03-19
Payer: MEDICARE

## 2025-03-19 VITALS
SYSTOLIC BLOOD PRESSURE: 122 MMHG | HEIGHT: 70 IN | HEART RATE: 75 BPM | WEIGHT: 207 LBS | RESPIRATION RATE: 14 BRPM | OXYGEN SATURATION: 95 % | DIASTOLIC BLOOD PRESSURE: 77 MMHG | BODY MASS INDEX: 29.63 KG/M2

## 2025-03-19 DIAGNOSIS — J98.4 RESTRICTIVE LUNG DISEASE: ICD-10-CM

## 2025-03-19 DIAGNOSIS — R06.02 SOB (SHORTNESS OF BREATH): Primary | ICD-10-CM

## 2025-03-19 DIAGNOSIS — G62.9 NEUROPATHY: ICD-10-CM

## 2025-03-19 DIAGNOSIS — I25.10 CORONARY ARTERY DISEASE INVOLVING NATIVE CORONARY ARTERY OF NATIVE HEART WITHOUT ANGINA PECTORIS: ICD-10-CM

## 2025-03-19 LAB
EXPIRATORY TIME: NORMAL
FEF 25-75% %PRED-PRE: NORMAL
FEF 25-75% PRED: NORMAL
FEF 25-75-PRE: NORMAL
FEV1 %PRED-PRE: 70 %
FEV1 PRED: 2.76 L
FEV1/FVC %PRED-PRE: NORMAL
FEV1/FVC PRED: NORMAL
FEV1/FVC: 72 %
FEV1: 1.92 L
FVC %PRED-PRE: 71 %
FVC PRED: 3.76 L
FVC: 2.66 L
PEF %PRED-PRE: NORMAL
PEF PRED: NORMAL
PEF-PRE: NORMAL

## 2025-03-19 PROCEDURE — 3078F DIAST BP <80 MM HG: CPT | Performed by: INTERNAL MEDICINE

## 2025-03-19 PROCEDURE — 94010 BREATHING CAPACITY TEST: CPT | Performed by: INTERNAL MEDICINE

## 2025-03-19 PROCEDURE — 1159F MED LIST DOCD IN RCRD: CPT | Performed by: INTERNAL MEDICINE

## 2025-03-19 PROCEDURE — G8427 DOCREV CUR MEDS BY ELIG CLIN: HCPCS | Performed by: INTERNAL MEDICINE

## 2025-03-19 PROCEDURE — 3074F SYST BP LT 130 MM HG: CPT | Performed by: INTERNAL MEDICINE

## 2025-03-19 PROCEDURE — 99204 OFFICE O/P NEW MOD 45 MIN: CPT | Performed by: INTERNAL MEDICINE

## 2025-03-19 PROCEDURE — G8417 CALC BMI ABV UP PARAM F/U: HCPCS | Performed by: INTERNAL MEDICINE

## 2025-03-19 PROCEDURE — 1123F ACP DISCUSS/DSCN MKR DOCD: CPT | Performed by: INTERNAL MEDICINE

## 2025-03-19 PROCEDURE — 1036F TOBACCO NON-USER: CPT | Performed by: INTERNAL MEDICINE

## 2025-03-19 ASSESSMENT — PULMONARY FUNCTION TESTS
FVC_PERCENT_PREDICTED_PRE: 71
FEV1: 1.92
FEV1_PREDICTED: 2.76
FEV1/FVC: 72
FVC: 2.66
FEV1_PERCENT_PREDICTED_PRE: 70
FVC_PREDICTED: 3.76

## 2025-03-19 NOTE — PROGRESS NOTES
exposure: Never      Smokeless tobacco: Never    Allergies   Allergen Reactions    Ciprofloxacin Nausea Only    Metoprolol Other (See Comments)     BP erratic     Current Outpatient Medications   Medication Instructions    aspirin 81 mg, Oral, DAILY    atorvastatin (LIPITOR) 80 mg, Oral, DAILY    carvedilol (COREG) 25 mg, Oral, 2 TIMES DAILY    Cholecalciferol 50 MCG (2000 UT) TABS DAILY    clopidogrel (PLAVIX) 75 mg, Oral, DAILY    cyanocobalamin 1,000 mcg, DAILY    escitalopram (LEXAPRO) 20 mg, DAILY    ezetimibe (ZETIA) 10 mg, Oral, DAILY    hypromellose (ISOPTO TEARS) 0.5 % ophthalmic solution 1 drop, 3 times daily    isosorbide mononitrate (IMDUR) 30 mg, Oral, DAILY    nitroGLYCERIN (NITROSTAT) 0.4 mg, SubLINGual, EVERY 5 MIN PRN    ramipril (ALTACE) 10 mg, Oral, DAILY    traMADol (ULTRAM) 50 mg, EVERY 6 HOURS PRN

## 2025-03-28 ENCOUNTER — HOSPITAL ENCOUNTER (OUTPATIENT)
Dept: CT IMAGING | Age: 85
Discharge: HOME | End: 2025-03-30
Attending: INTERNAL MEDICINE
Payer: MEDICARE

## 2025-03-28 DIAGNOSIS — J98.4 RESTRICTIVE LUNG DISEASE: ICD-10-CM

## 2025-03-28 PROCEDURE — 71250 CT THORAX DX C-: CPT

## 2025-04-04 ENCOUNTER — RESULTS FOLLOW-UP (OUTPATIENT)
Dept: PULMONOLOGY | Age: 85
End: 2025-04-04

## 2025-04-14 RX ORDER — CLOPIDOGREL BISULFATE 75 MG/1
75 TABLET ORAL DAILY
Qty: 90 TABLET | Refills: 3 | Status: SHIPPED | OUTPATIENT
Start: 2025-04-14

## 2025-04-14 NOTE — TELEPHONE ENCOUNTER
Requested Prescriptions     Pending Prescriptions Disp Refills    clopidogrel (PLAVIX) 75 MG tablet [Pharmacy Med Name: CLOPIDOGREL 75 MG TAB[*]] 90 tablet 3     Sig: TAKE ONE TABLET BY MOUTH ONE TIME DAILY     Verified rx. Last OV 2/25/25. Erx to pharm on file.

## 2025-04-22 ENCOUNTER — PROCEDURE VISIT (OUTPATIENT)
Dept: NEUROLOGY | Age: 85
End: 2025-04-22

## 2025-04-22 VITALS — OXYGEN SATURATION: 96 % | HEIGHT: 69 IN | HEART RATE: 80 BPM | WEIGHT: 207 LBS | BODY MASS INDEX: 30.66 KG/M2

## 2025-04-22 DIAGNOSIS — G62.9 POLYNEUROPATHY: Primary | ICD-10-CM

## 2025-04-22 NOTE — PROGRESS NOTES
EMG/Nerve Conduction Study Procedure Note  [address]    [address]  [place]  [address]   [phone]      Hx:    Exam:     85 y.o.  mw male     EMG::  neuropathy.  Lowers   BLE   ==  LLE = ? Radiculopathy and hx L/S fusions.  Atrophy left gastroc.    No babinski.  Uses a cane.  Referring:    Dr. Trey Arnold       PCP   =   Dr Nayeli Diego   Technologist ::   Harley Holland  Hgt:      5 foot 10 inch        Summary     needle EMG select muscles with CV studies.                 Controlled environmental factors / EMG lab.  Temperature.   NCV : sensory segments:    Markedly abnormal = = ABSENT/no response of bilateral sural SNAP.        NCV plantar sensory segments:      Deferred.  NCV Motor MCV segments:     Markedly abnormal = ABSENT /no response of the CMAP left peroneal nerve all segments.  Markedly attenuated right peroneal bilateral tibial MCV CMAP.  Normal bilateral peroneal to the tibialis anterior proximal MCV in the lower extremities.        F-wave studies:   Abnormal basically absent F waves lower extremities.        H-REFLEX Studies:    Markedly abnormal = = ABSENT /no response bilateral H reflex.   NEEDLE EMG:   Tested muscles::    Bilateral TA MG VL muscles = = the bilateral gastrocnemius with discrete recruitment pattern but on the left there is also 3+3+3+ IA fibs PSW without giant MUP.  No other abnormalities noted.                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  THERE IS ALSO WHAT APPEARS TO BE LEFT S1 RADICULOPATHY SUPERIMPOSED.  SOME ACTIVE AXONAL DENERVATION AT THE LEFT S1 TO THE GASTROCNEMIUS.            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  There is also a left S1 radiculopathy evidence.             Procedure Details:       THESE FINDINGS REVEAL A GENERALIZED SENSORIMOTOR POLYNEUROPATHY WITH

## 2025-06-12 ENCOUNTER — OFFICE VISIT (OUTPATIENT)
Age: 85
End: 2025-06-12
Payer: MEDICARE

## 2025-06-12 VITALS
DIASTOLIC BLOOD PRESSURE: 78 MMHG | SYSTOLIC BLOOD PRESSURE: 136 MMHG | HEIGHT: 69 IN | BODY MASS INDEX: 30.96 KG/M2 | WEIGHT: 209 LBS | HEART RATE: 105 BPM

## 2025-06-12 DIAGNOSIS — I25.10 ASCVD (ARTERIOSCLEROTIC CARDIOVASCULAR DISEASE): Primary | ICD-10-CM

## 2025-06-12 DIAGNOSIS — I10 PRIMARY HYPERTENSION: ICD-10-CM

## 2025-06-12 PROCEDURE — G8428 CUR MEDS NOT DOCUMENT: HCPCS | Performed by: INTERNAL MEDICINE

## 2025-06-12 PROCEDURE — G8417 CALC BMI ABV UP PARAM F/U: HCPCS | Performed by: INTERNAL MEDICINE

## 2025-06-12 PROCEDURE — 3078F DIAST BP <80 MM HG: CPT | Performed by: INTERNAL MEDICINE

## 2025-06-12 PROCEDURE — 1036F TOBACCO NON-USER: CPT | Performed by: INTERNAL MEDICINE

## 2025-06-12 PROCEDURE — 3075F SYST BP GE 130 - 139MM HG: CPT | Performed by: INTERNAL MEDICINE

## 2025-06-12 PROCEDURE — 99214 OFFICE O/P EST MOD 30 MIN: CPT | Performed by: INTERNAL MEDICINE

## 2025-06-12 PROCEDURE — 1123F ACP DISCUSS/DSCN MKR DOCD: CPT | Performed by: INTERNAL MEDICINE

## 2025-06-12 PROCEDURE — 1126F AMNT PAIN NOTED NONE PRSNT: CPT | Performed by: INTERNAL MEDICINE

## 2025-06-12 NOTE — PROGRESS NOTES
[facility] CARDIOLOGY  [address] [place] [address], [address]  [place] [address]  PHONE: [phone]        25        NAME:  Jesus Mcgovern .  : 1940  MRN: [id number]     Ascvd  Htn  Neuropathy    CHIEF COMPLAINT:    Coronary Artery Disease and Atrial Fibrillation      SUBJECTIVE:       Occ jaw pain.  Better w/ NTG.  No sob or dizziness or palpitation.  Has seen pulmonary: all ok  Has seen neurology: + neuropathy.       Medications were all reviewed with the patient today and updated as necessary.   Current Outpatient Medications   Medication Sig    clopidogrel (PLAVIX) 75 MG tablet TAKE ONE TABLET BY MOUTH ONE TIME DAILY    escitalopram (LEXAPRO) 20 MG tablet Take 1 tablet by mouth daily    carvedilol (COREG) 25 MG tablet Take 1 tablet by mouth 2 times daily    atorvastatin (LIPITOR) 80 MG tablet Take 1 tablet by mouth daily    ramipril (ALTACE) 10 MG capsule Take 1 capsule by mouth daily    ezetimibe (ZETIA) 10 MG tablet Take 1 tablet by mouth daily    nitroGLYCERIN (NITROSTAT) 0.4 MG SL tablet Place 1 tablet under the tongue every 5 minutes as needed for Chest pain    traMADol (ULTRAM) 50 MG tablet Take 1 tablet by mouth every 6 hours as needed for Pain.    aspirin 81 MG EC tablet Take 1 tablet by mouth daily     No current facility-administered medications for this visit.        Allergies   Allergen Reactions    Ciprofloxacin Nausea Only    Metoprolol Other (See Comments)     BP erratic           PHYSICAL EXAM:     Wt Readings from Last 3 Encounters:   25 94.8 kg (209 lb)   25 93.9 kg (207 lb)   25 93.9 kg (207 lb)     BP Readings from Last 3 Encounters:   25 136/78   25 122/77   25 139/81       /78   Pulse (!) 105   Ht 1.753 m (5' 9\")   Wt 94.8 kg (209 lb)   BMI 30.86 kg/m²     Physical Exam  Vitals reviewed.   HENT:      Head: Normocephalic and atraumatic.   Eyes:      Extraocular Movements: Extraocular movements intact.      Pupils: Pupils are

## 2025-07-31 ENCOUNTER — HOSPITAL ENCOUNTER (EMERGENCY)
Age: 85
Discharge: HOME | End: 2025-07-31
Attending: EMERGENCY MEDICINE
Payer: MEDICARE

## 2025-07-31 ENCOUNTER — APPOINTMENT (OUTPATIENT)
Dept: CT IMAGING | Age: 85
End: 2025-07-31
Payer: MEDICARE

## 2025-07-31 ENCOUNTER — APPOINTMENT (OUTPATIENT)
Dept: GENERAL RADIOLOGY | Age: 85
End: 2025-07-31
Payer: MEDICARE

## 2025-07-31 VITALS
TEMPERATURE: 97.6 F | OXYGEN SATURATION: 95 % | WEIGHT: 196 LBS | RESPIRATION RATE: 28 BRPM | HEART RATE: 70 BPM | BODY MASS INDEX: 29.03 KG/M2 | HEIGHT: 69 IN | SYSTOLIC BLOOD PRESSURE: 134 MMHG | DIASTOLIC BLOOD PRESSURE: 102 MMHG

## 2025-07-31 DIAGNOSIS — R07.89 CHEST PRESSURE: Primary | ICD-10-CM

## 2025-07-31 DIAGNOSIS — R41.0 CONFUSION AND DISORIENTATION: ICD-10-CM

## 2025-07-31 DIAGNOSIS — R06.02 SHORTNESS OF BREATH: ICD-10-CM

## 2025-07-31 LAB
ALBUMIN SERPL-MCNC: 3.6 G/DL (ref 3.2–4.6)
ALBUMIN/GLOB SERPL: 1.4 (ref 1–1.9)
ALP SERPL-CCNC: 146 U/L (ref 40–129)
ALT SERPL-CCNC: 29 U/L (ref 8–55)
ANION GAP SERPL CALC-SCNC: 12 MMOL/L (ref 7–16)
AST SERPL-CCNC: 28 U/L (ref 15–37)
BASOPHILS # BLD: 0.02 K/UL (ref 0–0.2)
BASOPHILS NFR BLD: 0.2 % (ref 0–2)
BILIRUB SERPL-MCNC: 0.6 MG/DL (ref 0–1.2)
BUN SERPL-MCNC: 21 MG/DL (ref 8–23)
CALCIUM SERPL-MCNC: 9 MG/DL (ref 8.8–10.2)
CHLORIDE SERPL-SCNC: 106 MMOL/L (ref 98–107)
CO2 SERPL-SCNC: 21 MMOL/L (ref 20–29)
CREAT SERPL-MCNC: 0.97 MG/DL (ref 0.8–1.3)
D DIMER PPP FEU-MCNC: <0.27 UG/ML(FEU)
DIFFERENTIAL METHOD BLD: ABNORMAL
EKG ATRIAL RATE: 92 BPM
EKG DIAGNOSIS: NORMAL
EKG P AXIS: 49 DEGREES
EKG P-R INTERVAL: 132 MS
EKG Q-T INTERVAL: 366 MS
EKG QRS DURATION: 96 MS
EKG QTC CALCULATION (BAZETT): 452 MS
EKG R AXIS: -79 DEGREES
EKG T AXIS: 20 DEGREES
EKG VENTRICULAR RATE: 92 BPM
EOSINOPHIL # BLD: 0.08 K/UL (ref 0–0.8)
EOSINOPHIL NFR BLD: 1 % (ref 0.5–7.8)
ERYTHROCYTE [DISTWIDTH] IN BLOOD BY AUTOMATED COUNT: 13.9 % (ref 11.9–14.6)
GLOBULIN SER CALC-MCNC: 2.6 G/DL (ref 2.3–3.5)
GLUCOSE SERPL-MCNC: 121 MG/DL (ref 70–99)
HCT VFR BLD AUTO: 46.1 % (ref 41.1–50.3)
HGB BLD-MCNC: 15.6 G/DL (ref 13.6–17.2)
IMM GRANULOCYTES # BLD AUTO: 0.02 K/UL (ref 0–0.5)
IMM GRANULOCYTES NFR BLD AUTO: 0.2 % (ref 0–5)
LYMPHOCYTES # BLD: 1.72 K/UL (ref 0.5–4.6)
LYMPHOCYTES NFR BLD: 21.3 % (ref 13–44)
MAGNESIUM SERPL-MCNC: 2.2 MG/DL (ref 1.8–2.4)
MCH RBC QN AUTO: 30.6 PG (ref 26.1–32.9)
MCHC RBC AUTO-ENTMCNC: 33.8 G/DL (ref 31.4–35)
MCV RBC AUTO: 90.4 FL (ref 82–102)
MONOCYTES # BLD: 0.54 K/UL (ref 0.1–1.3)
MONOCYTES NFR BLD: 6.7 % (ref 4–12)
NEUTS SEG # BLD: 5.68 K/UL (ref 1.7–8.2)
NEUTS SEG NFR BLD: 70.6 % (ref 43–78)
NRBC # BLD: 0 K/UL (ref 0–0.2)
NT PRO BNP: 229 PG/ML (ref 0–450)
PLATELET # BLD AUTO: 147 K/UL (ref 150–450)
PMV BLD AUTO: 10 FL (ref 9.4–12.3)
POTASSIUM SERPL-SCNC: 4 MMOL/L (ref 3.5–5.1)
PROT SERPL-MCNC: 6.2 G/DL (ref 6.3–8.2)
RBC # BLD AUTO: 5.1 M/UL (ref 4.23–5.6)
SODIUM SERPL-SCNC: 139 MMOL/L (ref 136–145)
TROPONIN T SERPL HS-MCNC: 25 NG/L (ref 0–22)
WBC # BLD AUTO: 8.1 K/UL (ref 4.3–11.1)

## 2025-07-31 PROCEDURE — 70450 CT HEAD/BRAIN W/O DYE: CPT

## 2025-07-31 PROCEDURE — 84484 ASSAY OF TROPONIN QUANT: CPT

## 2025-07-31 PROCEDURE — 99285 EMERGENCY DEPT VISIT HI MDM: CPT

## 2025-07-31 PROCEDURE — 80053 COMPREHEN METABOLIC PANEL: CPT

## 2025-07-31 PROCEDURE — 85379 FIBRIN DEGRADATION QUANT: CPT

## 2025-07-31 PROCEDURE — 83880 ASSAY OF NATRIURETIC PEPTIDE: CPT

## 2025-07-31 PROCEDURE — 93005 ELECTROCARDIOGRAM TRACING: CPT | Performed by: EMERGENCY MEDICINE

## 2025-07-31 PROCEDURE — 83735 ASSAY OF MAGNESIUM: CPT

## 2025-07-31 PROCEDURE — 93010 ELECTROCARDIOGRAM REPORT: CPT | Performed by: INTERNAL MEDICINE

## 2025-07-31 PROCEDURE — 71046 X-RAY EXAM CHEST 2 VIEWS: CPT

## 2025-07-31 PROCEDURE — 85025 COMPLETE CBC W/AUTO DIFF WBC: CPT

## 2025-07-31 RX ORDER — ALBUTEROL SULFATE 90 UG/1
2 INHALANT RESPIRATORY (INHALATION) 4 TIMES DAILY PRN
Qty: 18 G | Refills: 5 | Status: SHIPPED | OUTPATIENT
Start: 2025-07-31

## 2025-07-31 ASSESSMENT — PAIN - FUNCTIONAL ASSESSMENT: PAIN_FUNCTIONAL_ASSESSMENT: NONE - DENIES PAIN

## 2025-07-31 NOTE — ED TRIAGE NOTES
Patient a 86 y/o Male reports to the ED with c/c of Numbness to the left side of his body. States the numbness woke him up. States he also feels like there was a weight on his chest. States the numbness radiates into his neck and head. Has a cardiac Hx of Mi's and stents and neuropathy.  Patient endorses some confusion this morning.

## 2025-07-31 NOTE — DISCHARGE INSTRUCTIONS
Call your cardiologist and the neurologist tomorrow afternoon if you have not heard from them in the morning regarding to get follow-up appointments.  Also check with pulmonary regarding scheduling that CAT scan of the chest.  Use spacer with inhaler 4 times a day for about a week to see if that helps out with the shortness of breath.  Recheck sooner for worse or worrisome symptoms

## 2025-07-31 NOTE — ED PROVIDER NOTES
Emergency Department Provider Note       [facility] EMERGENCY DEPT   PCP: Nayeli Diego MD   Age: 85 y.o.   Sex: male     DISPOSITION Decision To Discharge 07/31/2025 10:43:56 AM    ICD-10-CM    1. Chest pressure  R07.89 [facility]      2. Shortness of breath  R06.02 [facility]      3. Confusion and disorientation  R41.0 [facility]    Chronic          Medical Decision Making   Chest tightness and shortness of breath with discomfort up into the neck.  Somewhat vague discussion of dizziness in the left side of the chest.  Patient and spouse extremely worried about stroke.  Will obtain head CT scan to check for any evidence of bleeding since there is some headache as well as neck pain.  Do not believe CT angio would be needed if CT is negative.  Will get EKG and single troponin since discomfort has been going on the better part of the week.  Check BNP for congestive heart failure screen for pulmonary embolism with D-dimer.  Records were reviewed.  There was a cardiology visit in March where there was discussion of I cardiac catheterization needed but this was not brought up again with recent cardiology visit in June.  He did have catheterization of heart last year that showed 2 patent bypass grafts.  There was also recent pulmonary visit where restrictive lung disease was noted, of the mild nature.  Patient supposed to have low-dose CT scheduled but he has not had this done as of yet.  Will have patient call office for getting that scheduled.  Also given issues with word finding and memory over the last few weeks, we will rerefer back to neurology for follow-up  Workup negative for acute issues.  Needs referral back to neurology because of issues with word finding and confusion.  Referral back to pulmonary due to diagnosis of interstitial lung disease and patient has not

## 2025-08-11 ENCOUNTER — OFFICE VISIT (OUTPATIENT)
Age: 85
End: 2025-08-11
Payer: MEDICARE

## 2025-08-11 VITALS
DIASTOLIC BLOOD PRESSURE: 74 MMHG | WEIGHT: 191 LBS | HEIGHT: 69 IN | HEART RATE: 100 BPM | BODY MASS INDEX: 28.29 KG/M2 | SYSTOLIC BLOOD PRESSURE: 122 MMHG

## 2025-08-11 DIAGNOSIS — I25.10 ASCVD (ARTERIOSCLEROTIC CARDIOVASCULAR DISEASE): Primary | ICD-10-CM

## 2025-08-11 DIAGNOSIS — I10 PRIMARY HYPERTENSION: ICD-10-CM

## 2025-08-11 PROCEDURE — 3078F DIAST BP <80 MM HG: CPT | Performed by: INTERNAL MEDICINE

## 2025-08-11 PROCEDURE — 99214 OFFICE O/P EST MOD 30 MIN: CPT | Performed by: INTERNAL MEDICINE

## 2025-08-11 PROCEDURE — 1036F TOBACCO NON-USER: CPT | Performed by: INTERNAL MEDICINE

## 2025-08-11 PROCEDURE — G8417 CALC BMI ABV UP PARAM F/U: HCPCS | Performed by: INTERNAL MEDICINE

## 2025-08-11 PROCEDURE — 1126F AMNT PAIN NOTED NONE PRSNT: CPT | Performed by: INTERNAL MEDICINE

## 2025-08-11 PROCEDURE — 1123F ACP DISCUSS/DSCN MKR DOCD: CPT | Performed by: INTERNAL MEDICINE

## 2025-08-11 PROCEDURE — 3074F SYST BP LT 130 MM HG: CPT | Performed by: INTERNAL MEDICINE

## 2025-08-11 PROCEDURE — G8428 CUR MEDS NOT DOCUMENT: HCPCS | Performed by: INTERNAL MEDICINE

## 2025-08-25 ENCOUNTER — OFFICE VISIT (OUTPATIENT)
Dept: NEUROLOGY | Age: 85
End: 2025-08-25
Payer: MEDICARE

## 2025-08-25 ENCOUNTER — RESULTS FOLLOW-UP (OUTPATIENT)
Dept: NEUROLOGY | Age: 85
End: 2025-08-25

## 2025-08-25 VITALS
WEIGHT: 194 LBS | HEIGHT: 69 IN | HEART RATE: 89 BPM | SYSTOLIC BLOOD PRESSURE: 136 MMHG | DIASTOLIC BLOOD PRESSURE: 87 MMHG | BODY MASS INDEX: 28.73 KG/M2

## 2025-08-25 DIAGNOSIS — R41.3 MEMORY DEFICIT: ICD-10-CM

## 2025-08-25 DIAGNOSIS — R06.83 SNORING: ICD-10-CM

## 2025-08-25 DIAGNOSIS — G62.9 POLYNEUROPATHY: ICD-10-CM

## 2025-08-25 DIAGNOSIS — R41.3 MEMORY DEFICIT: Primary | ICD-10-CM

## 2025-08-25 DIAGNOSIS — Z91.81 HISTORY OF FALLING: ICD-10-CM

## 2025-08-25 DIAGNOSIS — Z86.59 HISTORY OF DEPRESSION: ICD-10-CM

## 2025-08-25 PROBLEM — I50.22 CHRONIC SYSTOLIC HEART FAILURE (HCC): Status: ACTIVE | Noted: 2025-06-19

## 2025-08-25 PROBLEM — M48.061 SPINAL STENOSIS OF LUMBAR REGION: Status: ACTIVE | Noted: 2018-04-11

## 2025-08-25 PROBLEM — Z95.820 S/P PERCUTANEOUS TRANSLUMINAL ANGIOPLASTY (PTA) WITH STENT PLACEMENT: Status: ACTIVE | Noted: 2023-05-29

## 2025-08-25 LAB
FOLATE SERPL-MCNC: 12.9 NG/ML (ref 3.1–17.5)
TSH W FREE THYROID IF ABNORMAL: 2.38 UIU/ML (ref 0.27–4.2)
VIT B12 SERPL-MCNC: 489 PG/ML (ref 193–986)

## 2025-08-25 PROCEDURE — 1160F RVW MEDS BY RX/DR IN RCRD: CPT | Performed by: NURSE PRACTITIONER

## 2025-08-25 PROCEDURE — 1036F TOBACCO NON-USER: CPT | Performed by: NURSE PRACTITIONER

## 2025-08-25 PROCEDURE — 3079F DIAST BP 80-89 MM HG: CPT | Performed by: NURSE PRACTITIONER

## 2025-08-25 PROCEDURE — G8427 DOCREV CUR MEDS BY ELIG CLIN: HCPCS | Performed by: NURSE PRACTITIONER

## 2025-08-25 PROCEDURE — 1159F MED LIST DOCD IN RCRD: CPT | Performed by: NURSE PRACTITIONER

## 2025-08-25 PROCEDURE — G8417 CALC BMI ABV UP PARAM F/U: HCPCS | Performed by: NURSE PRACTITIONER

## 2025-08-25 PROCEDURE — 1123F ACP DISCUSS/DSCN MKR DOCD: CPT | Performed by: NURSE PRACTITIONER

## 2025-08-25 PROCEDURE — 99215 OFFICE O/P EST HI 40 MIN: CPT | Performed by: NURSE PRACTITIONER

## 2025-08-25 PROCEDURE — 3075F SYST BP GE 130 - 139MM HG: CPT | Performed by: NURSE PRACTITIONER

## 2025-08-25 ASSESSMENT — ENCOUNTER SYMPTOMS
EYES NEGATIVE: 1
GASTROINTESTINAL NEGATIVE: 1
BACK PAIN: 1
RESPIRATORY NEGATIVE: 1
ALLERGIC/IMMUNOLOGIC NEGATIVE: 1

## 2025-08-26 ENCOUNTER — TELEPHONE (OUTPATIENT)
Dept: NEUROLOGY | Age: 85
End: 2025-08-26

## 2025-08-26 DIAGNOSIS — G30.9 ALZHEIMER'S DISEASE (HCC): Primary | ICD-10-CM

## 2025-08-26 DIAGNOSIS — F02.80 ALZHEIMER'S DISEASE (HCC): Primary | ICD-10-CM

## 2025-08-27 LAB — VIT B1 BLD-SCNC: 117.6 NMOL/L (ref 66.5–200)

## 2025-08-28 LAB — IMMUNOLOGIST REVIEW: NORMAL

## (undated) DEVICE — ELECTRD CUT LP ANG 27FR BRN

## (undated) DEVICE — DEVICE COMPR LNG 27 CM VASC BND

## (undated) DEVICE — CONTAINER SPEC FRMLN 120ML --

## (undated) DEVICE — KENDALL SCD EXPRESS SLEEVES, KNEE LENGTH, MEDIUM: Brand: KENDALL SCD

## (undated) DEVICE — CATHETER DIAG 6FR L100CM GWIRE 0.038IN S STL POLYUR MP W/ 2

## (undated) DEVICE — GUIDEWIRE 035IN 210CM PTFE COAT FIX COR J TIP 15MM FIRM BODY

## (undated) DEVICE — GOWN,REINFORCED,POLY,AURORA,XXLARGE,STR: Brand: MEDLINE

## (undated) DEVICE — CATHETER COR DIAG JUDKINS L 3.5 CRV 6FR 100CM 0 SIDE H

## (undated) DEVICE — CYSTO: Brand: MEDLINE INDUSTRIES, INC.

## (undated) DEVICE — CATHETER COR DIAG JUDKINS R 5.0 CRV 6FR 100CM 0 SIDE H

## (undated) DEVICE — GUIDEWIRE VASC L260CM DIA0.035IN RAD 3MM J TIP L7CM PTFE

## (undated) DEVICE — PACK PROCEDURE SURG TRANSURETHRAL RESECT OF PROST CDS

## (undated) DEVICE — GLIDESHEATH SLENDER STAINLESS STEEL KIT: Brand: GLIDESHEATH SLENDER

## (undated) DEVICE — REM POLYHESIVE ADULT PATIENT RETURN ELECTRODE: Brand: VALLEYLAB

## (undated) DEVICE — CATHETER URETH 24FR BLLN 30CC STD LTX 3 W TWO OPP DRNGE EYE

## (undated) DEVICE — Y-TYPE TUR/BLADDER IRRIGATION SET, REGULATING CLAMP

## (undated) DEVICE — CATHETER DIAG 6FR L100CM SPEC 3 DRC CRV SZ DBL BRAID WIRE

## (undated) DEVICE — CYSTO/BLADDER IRRIGATION SET, REGULATING CLAMP

## (undated) DEVICE — CATHETER DIAG 6FR L110CM PIGTAILS CRV STYL PIG145 DXTERITY

## (undated) DEVICE — INTRODUCER SHTH 6FR L11CM 0.038IN STD SIDEPRT EXTN 3 W

## (undated) DEVICE — SYR IRR CATH TIP LR ADPT 70ML -- CONVERT TO ITEM 363120

## (undated) DEVICE — COMPRESSION DEVICE 24 CM 10 ML LEFT

## (undated) DEVICE — SUTURE PERMAHAND SZ 2-0 L30IN NONABSORBABLE BLK L17MM BB K883H

## (undated) DEVICE — CATHETER DIAG AD 6FR L100CM 0.038IN STD COR POLYUR JUDKINS

## (undated) DEVICE — SOLUTION IRRIG 1000ML H2O STRL BLT

## (undated) DEVICE — BAG DRNGE 4000ML CONT IRRIG ROUNDED TEARDROP SHP DISP

## (undated) DEVICE — CATHETER ANGIO JL4 0.038 IN AD 6 FRX100 CM STD SUPER TORQUE

## (undated) DEVICE — CATHETER DIAG AD 5FR L125CM COR NYL MP AMPLATZ 2 W/ 2 SIDE

## (undated) DEVICE — GUIDE NDL ST W/ 14 X 147CM TELESCOPICALLY FLD CIV FLX CVR

## (undated) DEVICE — COVER US PRB W15XL244CM ST SURGICAL/INTRAOPERATIVE W/

## (undated) DEVICE — SOL IRR GLYC 1.5 % 3000ML --

## (undated) DEVICE — SOLUTION IRRIG 3000ML H2O STRL BAG

## (undated) DEVICE — LASER SURG FIBER 1000 MH RND TIP HOLM SMARTSCOPE DISP

## (undated) DEVICE — CATHETER ANGIO 4FR L100CM S STL NYL IM 3 SEG BRAID SFT

## (undated) DEVICE — CATHETER VASC 6 FR DIAG PGTL W/ SIDE H COR 110 CM LEN W/OUT

## (undated) DEVICE — TRAY PREP DRY W/ PREM GLV 2 APPL 6 SPNG 2 UNDPD 1 OVERWRAP

## (undated) DEVICE — DEVICE SECUREMENT AD W/ TRICOT ANCHR PD FOR F LTX SIL CATH

## (undated) DEVICE — CATHETER COR DIAG AMPLATZ L 1.0 CRV 6FR 100CM 0 SIDE H

## (undated) DEVICE — CATHETER DIAG 5FR L100CM SPEC IMA CRV SZ DBL BRAID WIRE SFT